# Patient Record
Sex: FEMALE | Race: WHITE | Employment: FULL TIME | URBAN - NONMETROPOLITAN AREA
[De-identification: names, ages, dates, MRNs, and addresses within clinical notes are randomized per-mention and may not be internally consistent; named-entity substitution may affect disease eponyms.]

---

## 2017-03-11 ENCOUNTER — OFFICE VISIT (OUTPATIENT)
Dept: PRIMARY CARE CLINIC | Age: 47
End: 2017-03-11
Payer: COMMERCIAL

## 2017-03-11 VITALS
HEART RATE: 81 BPM | WEIGHT: 202 LBS | HEIGHT: 63 IN | BODY MASS INDEX: 35.79 KG/M2 | OXYGEN SATURATION: 99 % | SYSTOLIC BLOOD PRESSURE: 134 MMHG | DIASTOLIC BLOOD PRESSURE: 90 MMHG

## 2017-03-11 DIAGNOSIS — M25.522 ELBOW PAIN, LEFT: ICD-10-CM

## 2017-03-11 DIAGNOSIS — S52.102A CLOSED FRACTURE OF PROXIMAL END OF LEFT RADIUS, UNSPECIFIED FRACTURE MORPHOLOGY, INITIAL ENCOUNTER: Primary | ICD-10-CM

## 2017-03-11 PROCEDURE — 99214 OFFICE O/P EST MOD 30 MIN: CPT | Performed by: FAMILY MEDICINE

## 2017-03-11 PROCEDURE — A4565 SLINGS: HCPCS | Performed by: FAMILY MEDICINE

## 2017-03-11 RX ORDER — HYDROCODONE BITARTRATE AND ACETAMINOPHEN 5; 325 MG/1; MG/1
1 TABLET ORAL EVERY 6 HOURS PRN
Qty: 20 TABLET | Refills: 0 | Status: SHIPPED | OUTPATIENT
Start: 2017-03-11 | End: 2017-05-02 | Stop reason: ALTCHOICE

## 2017-03-11 ASSESSMENT — ENCOUNTER SYMPTOMS
RESPIRATORY NEGATIVE: 1
EYES NEGATIVE: 1
GASTROINTESTINAL NEGATIVE: 1
BACK PAIN: 0

## 2017-03-13 ENCOUNTER — OFFICE VISIT (OUTPATIENT)
Dept: ORTHOPEDIC SURGERY | Age: 47
End: 2017-03-13
Payer: COMMERCIAL

## 2017-03-13 VITALS
WEIGHT: 201.94 LBS | SYSTOLIC BLOOD PRESSURE: 130 MMHG | HEIGHT: 63 IN | HEART RATE: 72 BPM | DIASTOLIC BLOOD PRESSURE: 68 MMHG | BODY MASS INDEX: 35.78 KG/M2

## 2017-03-13 DIAGNOSIS — S52.502A CLOSED FRACTURE OF DISTAL END OF LEFT RADIUS, UNSPECIFIED FRACTURE MORPHOLOGY, INITIAL ENCOUNTER: Primary | ICD-10-CM

## 2017-03-13 PROCEDURE — 25600 CLTX DST RDL FX/EPHYS SEP WO: CPT | Performed by: PHYSICIAN ASSISTANT

## 2017-03-13 PROCEDURE — 99203 OFFICE O/P NEW LOW 30 MIN: CPT | Performed by: PHYSICIAN ASSISTANT

## 2017-03-30 DIAGNOSIS — S42.402S LEFT ELBOW FRACTURE, SEQUELA: Primary | ICD-10-CM

## 2017-04-03 ENCOUNTER — OFFICE VISIT (OUTPATIENT)
Dept: ORTHOPEDIC SURGERY | Age: 47
End: 2017-04-03

## 2017-04-03 VITALS
WEIGHT: 201 LBS | DIASTOLIC BLOOD PRESSURE: 62 MMHG | HEART RATE: 62 BPM | SYSTOLIC BLOOD PRESSURE: 124 MMHG | HEIGHT: 63 IN | BODY MASS INDEX: 35.61 KG/M2

## 2017-04-03 DIAGNOSIS — S42.402S LEFT ELBOW FRACTURE, SEQUELA: Primary | ICD-10-CM

## 2017-04-03 PROCEDURE — 99024 POSTOP FOLLOW-UP VISIT: CPT | Performed by: PHYSICIAN ASSISTANT

## 2017-04-12 RX ORDER — BUDESONIDE AND FORMOTEROL FUMARATE DIHYDRATE 80; 4.5 UG/1; UG/1
2 AEROSOL RESPIRATORY (INHALATION) DAILY
Qty: 1 INHALER | Refills: 0 | Status: SHIPPED | OUTPATIENT
Start: 2017-04-12 | End: 2017-07-14 | Stop reason: SDUPTHER

## 2017-04-28 DIAGNOSIS — S42.401S ELBOW FRACTURE, RIGHT, SEQUELA: Primary | ICD-10-CM

## 2017-05-02 ENCOUNTER — OFFICE VISIT (OUTPATIENT)
Dept: ORTHOPEDIC SURGERY | Age: 47
End: 2017-05-02

## 2017-05-02 VITALS
HEIGHT: 62 IN | WEIGHT: 209 LBS | BODY MASS INDEX: 38.46 KG/M2 | SYSTOLIC BLOOD PRESSURE: 126 MMHG | DIASTOLIC BLOOD PRESSURE: 68 MMHG

## 2017-05-02 DIAGNOSIS — S52.125D CLOSED NONDISPLACED FRACTURE OF HEAD OF LEFT RADIUS WITH ROUTINE HEALING, SUBSEQUENT ENCOUNTER: Primary | ICD-10-CM

## 2017-05-02 PROCEDURE — 99024 POSTOP FOLLOW-UP VISIT: CPT | Performed by: PHYSICIAN ASSISTANT

## 2017-06-23 ENCOUNTER — TELEPHONE (OUTPATIENT)
Dept: FAMILY MEDICINE CLINIC | Age: 47
End: 2017-06-23

## 2017-07-14 RX ORDER — BUDESONIDE AND FORMOTEROL FUMARATE DIHYDRATE 80; 4.5 UG/1; UG/1
2 AEROSOL RESPIRATORY (INHALATION) DAILY
Qty: 1 INHALER | Refills: 0 | Status: SHIPPED | OUTPATIENT
Start: 2017-07-14 | End: 2017-08-03 | Stop reason: SDUPTHER

## 2017-08-03 ENCOUNTER — OFFICE VISIT (OUTPATIENT)
Dept: FAMILY MEDICINE CLINIC | Age: 47
End: 2017-08-03
Payer: COMMERCIAL

## 2017-08-03 VITALS
SYSTOLIC BLOOD PRESSURE: 118 MMHG | HEIGHT: 62 IN | DIASTOLIC BLOOD PRESSURE: 64 MMHG | BODY MASS INDEX: 37.73 KG/M2 | HEART RATE: 68 BPM | WEIGHT: 205 LBS

## 2017-08-03 DIAGNOSIS — J45.909 UNCOMPLICATED ASTHMA, UNSPECIFIED ASTHMA SEVERITY: ICD-10-CM

## 2017-08-03 DIAGNOSIS — Z23 NEED FOR PROPHYLACTIC VACCINATION AGAINST STREPTOCOCCUS PNEUMONIAE (PNEUMOCOCCUS): ICD-10-CM

## 2017-08-03 DIAGNOSIS — Z23 NEED FOR PROPHYLACTIC VACCINATION WITH TETANUS-DIPHTHERIA (TD): ICD-10-CM

## 2017-08-03 DIAGNOSIS — E66.9 OBESITY WITH BODY MASS INDEX 30 OR GREATER: ICD-10-CM

## 2017-08-03 DIAGNOSIS — Z00.00 PREVENTATIVE HEALTH CARE: Primary | ICD-10-CM

## 2017-08-03 PROCEDURE — 99214 OFFICE O/P EST MOD 30 MIN: CPT | Performed by: FAMILY MEDICINE

## 2017-08-03 RX ORDER — ALBUTEROL SULFATE 90 UG/1
2 AEROSOL, METERED RESPIRATORY (INHALATION) EVERY 6 HOURS PRN
Qty: 1 INHALER | Refills: 3 | Status: SHIPPED | OUTPATIENT
Start: 2017-08-03 | End: 2018-11-19 | Stop reason: SDUPTHER

## 2017-08-03 RX ORDER — BUDESONIDE AND FORMOTEROL FUMARATE DIHYDRATE 80; 4.5 UG/1; UG/1
2 AEROSOL RESPIRATORY (INHALATION) DAILY
Qty: 1 INHALER | Refills: 5 | Status: SHIPPED | OUTPATIENT
Start: 2017-08-03 | End: 2018-10-22 | Stop reason: SDUPTHER

## 2017-08-03 ASSESSMENT — ENCOUNTER SYMPTOMS
ALLERGIC/IMMUNOLOGIC NEGATIVE: 1
GASTROINTESTINAL NEGATIVE: 1
EYES NEGATIVE: 1
RESPIRATORY NEGATIVE: 1

## 2017-09-10 ENCOUNTER — TELEPHONE (OUTPATIENT)
Dept: GENERAL RADIOLOGY | Age: 47
End: 2017-09-10

## 2017-09-11 DIAGNOSIS — Z12.31 VISIT FOR SCREENING MAMMOGRAM: Primary | ICD-10-CM

## 2017-09-15 ENCOUNTER — HOSPITAL ENCOUNTER (OUTPATIENT)
Dept: LAB | Age: 47
Discharge: HOME | End: 2017-09-15
Payer: COMMERCIAL

## 2017-09-15 DIAGNOSIS — Z00.00 PREVENTATIVE HEALTH CARE: ICD-10-CM

## 2017-09-15 LAB
ABSOLUTE EOS #: 0.3 K/UL (ref 0–0.4)
ABSOLUTE LYMPH #: 1.9 K/UL (ref 1–4.8)
ABSOLUTE MONO #: 0.5 K/UL (ref 0.1–1.2)
ANION GAP SERPL CALCULATED.3IONS-SCNC: 13 MMOL/L (ref 9–17)
BASOPHILS # BLD: 1 % (ref 0–1)
BASOPHILS ABSOLUTE: 0 K/UL (ref 0–0.2)
BUN BLDV-MCNC: 15 MG/DL (ref 6–20)
BUN/CREAT BLD: 26 (ref 9–20)
CALCIUM SERPL-MCNC: 9.2 MG/DL (ref 8.6–10.4)
CHLORIDE BLD-SCNC: 103 MMOL/L (ref 98–107)
CHOLESTEROL/HDL RATIO: 4
CHOLESTEROL: 192 MG/DL
CO2: 24 MMOL/L (ref 20–31)
CREAT SERPL-MCNC: 0.58 MG/DL (ref 0.5–0.9)
DIFFERENTIAL TYPE: NORMAL
EOSINOPHILS RELATIVE PERCENT: 4 % (ref 1–7)
GFR AFRICAN AMERICAN: >60 ML/MIN
GFR NON-AFRICAN AMERICAN: >60 ML/MIN
GFR SERPL CREATININE-BSD FRML MDRD: ABNORMAL ML/MIN/{1.73_M2}
GFR SERPL CREATININE-BSD FRML MDRD: ABNORMAL ML/MIN/{1.73_M2}
GLUCOSE BLD-MCNC: 201 MG/DL (ref 70–99)
HCT VFR BLD CALC: 39.9 % (ref 36–46)
HDLC SERPL-MCNC: 48 MG/DL
HEMOGLOBIN: 13 G/DL (ref 12–16)
LDL CHOLESTEROL: 118 MG/DL (ref 0–130)
LYMPHOCYTES # BLD: 27 % (ref 16–46)
MCH RBC QN AUTO: 28.4 PG (ref 26–34)
MCHC RBC AUTO-ENTMCNC: 32.7 G/DL (ref 31–37)
MCV RBC AUTO: 86.7 FL (ref 80–100)
MONOCYTES # BLD: 7 % (ref 4–11)
PDW BLD-RTO: 13.6 % (ref 11–14.5)
PLATELET # BLD: 194 K/UL (ref 140–450)
PLATELET ESTIMATE: NORMAL
PMV BLD AUTO: 8.9 FL (ref 6–12)
POTASSIUM SERPL-SCNC: 5 MMOL/L (ref 3.7–5.3)
RBC # BLD: 4.6 M/UL (ref 4–5.2)
RBC # BLD: NORMAL 10*6/UL
SEG NEUTROPHILS: 61 % (ref 43–77)
SEGMENTED NEUTROPHILS ABSOLUTE COUNT: 4.3 K/UL (ref 1.8–7.7)
SODIUM BLD-SCNC: 140 MMOL/L (ref 135–144)
TRIGL SERPL-MCNC: 131 MG/DL
VLDLC SERPL CALC-MCNC: NORMAL MG/DL (ref 1–30)
WBC # BLD: 6.9 K/UL (ref 3.5–11)
WBC # BLD: NORMAL 10*3/UL

## 2017-09-15 PROCEDURE — 80061 LIPID PANEL: CPT

## 2017-09-15 PROCEDURE — 80048 BASIC METABOLIC PNL TOTAL CA: CPT

## 2017-09-15 PROCEDURE — 36415 COLL VENOUS BLD VENIPUNCTURE: CPT

## 2017-09-15 PROCEDURE — 85025 COMPLETE CBC W/AUTO DIFF WBC: CPT

## 2017-09-20 ENCOUNTER — TELEPHONE (OUTPATIENT)
Dept: FAMILY MEDICINE CLINIC | Age: 47
End: 2017-09-20

## 2017-09-21 ENCOUNTER — OFFICE VISIT (OUTPATIENT)
Dept: PRIMARY CARE CLINIC | Age: 47
End: 2017-09-21
Payer: COMMERCIAL

## 2017-09-21 VITALS
DIASTOLIC BLOOD PRESSURE: 72 MMHG | BODY MASS INDEX: 37.54 KG/M2 | HEART RATE: 72 BPM | SYSTOLIC BLOOD PRESSURE: 120 MMHG | WEIGHT: 204 LBS | HEIGHT: 62 IN

## 2017-09-21 DIAGNOSIS — E11.9 NEW ONSET TYPE 2 DIABETES MELLITUS (HCC): Primary | ICD-10-CM

## 2017-09-21 PROCEDURE — 99214 OFFICE O/P EST MOD 30 MIN: CPT | Performed by: FAMILY MEDICINE

## 2017-09-21 RX ORDER — METFORMIN HYDROCHLORIDE 750 MG/1
750 TABLET, EXTENDED RELEASE ORAL
Qty: 30 TABLET | Refills: 3 | Status: SHIPPED | OUTPATIENT
Start: 2017-09-21 | End: 2018-02-11 | Stop reason: SDUPTHER

## 2017-09-21 ASSESSMENT — ENCOUNTER SYMPTOMS
GASTROINTESTINAL NEGATIVE: 1
RESPIRATORY NEGATIVE: 1
ALLERGIC/IMMUNOLOGIC NEGATIVE: 1
EYES NEGATIVE: 1

## 2017-09-23 ENCOUNTER — HOSPITAL ENCOUNTER (OUTPATIENT)
Dept: MAMMOGRAPHY | Age: 47
Discharge: HOME | End: 2017-09-23
Payer: COMMERCIAL

## 2017-09-23 DIAGNOSIS — Z12.31 ENCOUNTER FOR MAMMOGRAM TO ESTABLISH BASELINE MAMMOGRAM: ICD-10-CM

## 2017-09-23 PROCEDURE — G0202 SCR MAMMO BI INCL CAD: HCPCS

## 2018-02-12 RX ORDER — METFORMIN HYDROCHLORIDE 750 MG/1
TABLET, EXTENDED RELEASE ORAL
Qty: 30 TABLET | Refills: 1 | Status: SHIPPED | OUTPATIENT
Start: 2018-02-12 | End: 2018-04-18 | Stop reason: SDUPTHER

## 2018-02-19 ENCOUNTER — HOSPITAL ENCOUNTER (OUTPATIENT)
Dept: LAB | Age: 48
Setting detail: SPECIMEN
Discharge: HOME | End: 2018-02-19
Payer: COMMERCIAL

## 2018-02-19 DIAGNOSIS — E11.9 NEW ONSET TYPE 2 DIABETES MELLITUS (HCC): ICD-10-CM

## 2018-02-19 LAB
ANION GAP SERPL CALCULATED.3IONS-SCNC: 14 MMOL/L (ref 9–17)
BUN BLDV-MCNC: 14 MG/DL (ref 6–20)
BUN/CREAT BLD: 23 (ref 9–20)
CALCIUM SERPL-MCNC: 8.7 MG/DL (ref 8.6–10.4)
CHLORIDE BLD-SCNC: 103 MMOL/L (ref 98–107)
CHOLESTEROL/HDL RATIO: 3.8
CHOLESTEROL: 162 MG/DL
CO2: 24 MMOL/L (ref 20–31)
CREAT SERPL-MCNC: 0.61 MG/DL (ref 0.5–0.9)
CREATININE URINE: 124 MG/DL (ref 28–217)
ESTIMATED AVERAGE GLUCOSE: 137 MG/DL
GFR AFRICAN AMERICAN: >60 ML/MIN
GFR NON-AFRICAN AMERICAN: >60 ML/MIN
GFR SERPL CREATININE-BSD FRML MDRD: ABNORMAL ML/MIN/{1.73_M2}
GFR SERPL CREATININE-BSD FRML MDRD: ABNORMAL ML/MIN/{1.73_M2}
GLUCOSE BLD-MCNC: 155 MG/DL (ref 70–99)
HBA1C MFR BLD: 6.4 % (ref 4.8–5.9)
HDLC SERPL-MCNC: 43 MG/DL
LDL CHOLESTEROL: 89 MG/DL (ref 0–130)
MICROALBUMIN/CREAT 24H UR: <12 MG/L
MICROALBUMIN/CREAT UR-RTO: NORMAL MCG/MG CREAT
POTASSIUM SERPL-SCNC: 4.5 MMOL/L (ref 3.7–5.3)
SODIUM BLD-SCNC: 141 MMOL/L (ref 135–144)
TRIGL SERPL-MCNC: 150 MG/DL
VLDLC SERPL CALC-MCNC: ABNORMAL MG/DL (ref 1–30)

## 2018-02-19 PROCEDURE — 82043 UR ALBUMIN QUANTITATIVE: CPT

## 2018-02-19 PROCEDURE — 36415 COLL VENOUS BLD VENIPUNCTURE: CPT

## 2018-02-19 PROCEDURE — 82570 ASSAY OF URINE CREATININE: CPT

## 2018-02-19 PROCEDURE — 83036 HEMOGLOBIN GLYCOSYLATED A1C: CPT

## 2018-02-19 PROCEDURE — 80061 LIPID PANEL: CPT

## 2018-02-19 PROCEDURE — 80048 BASIC METABOLIC PNL TOTAL CA: CPT

## 2018-04-18 RX ORDER — METFORMIN HYDROCHLORIDE 750 MG/1
TABLET, EXTENDED RELEASE ORAL
Qty: 30 TABLET | Refills: 5 | Status: SHIPPED | OUTPATIENT
Start: 2018-04-18 | End: 2018-11-17 | Stop reason: SDUPTHER

## 2018-08-25 ENCOUNTER — HOSPITAL ENCOUNTER (OUTPATIENT)
Dept: GENERAL RADIOLOGY | Age: 48
Discharge: HOME | End: 2018-08-27
Payer: COMMERCIAL

## 2018-08-25 ENCOUNTER — HOSPITAL ENCOUNTER (OUTPATIENT)
Age: 48
Discharge: HOME | End: 2018-08-27
Payer: COMMERCIAL

## 2018-08-25 ENCOUNTER — OFFICE VISIT (OUTPATIENT)
Dept: PRIMARY CARE CLINIC | Age: 48
End: 2018-08-25
Payer: COMMERCIAL

## 2018-08-25 VITALS
HEART RATE: 87 BPM | TEMPERATURE: 99.3 F | WEIGHT: 180.8 LBS | SYSTOLIC BLOOD PRESSURE: 116 MMHG | HEIGHT: 63 IN | BODY MASS INDEX: 32.04 KG/M2 | DIASTOLIC BLOOD PRESSURE: 80 MMHG

## 2018-08-25 DIAGNOSIS — M25.561 ACUTE PAIN OF RIGHT KNEE: Primary | ICD-10-CM

## 2018-08-25 DIAGNOSIS — M25.561 ACUTE PAIN OF RIGHT KNEE: ICD-10-CM

## 2018-08-25 PROCEDURE — 99214 OFFICE O/P EST MOD 30 MIN: CPT | Performed by: FAMILY MEDICINE

## 2018-08-25 PROCEDURE — 73562 X-RAY EXAM OF KNEE 3: CPT

## 2018-08-25 ASSESSMENT — PATIENT HEALTH QUESTIONNAIRE - PHQ9
SUM OF ALL RESPONSES TO PHQ9 QUESTIONS 1 & 2: 0
SUM OF ALL RESPONSES TO PHQ QUESTIONS 1-9: 0
2. FEELING DOWN, DEPRESSED OR HOPELESS: 0
1. LITTLE INTEREST OR PLEASURE IN DOING THINGS: 0
SUM OF ALL RESPONSES TO PHQ QUESTIONS 1-9: 0

## 2018-09-12 ENCOUNTER — TELEPHONE (OUTPATIENT)
Dept: FAMILY MEDICINE CLINIC | Age: 48
End: 2018-09-12

## 2018-09-12 DIAGNOSIS — E13.9 OTHER SPECIFIED DIABETES MELLITUS WITHOUT COMPLICATION, WITHOUT LONG-TERM CURRENT USE OF INSULIN (HCC): Primary | ICD-10-CM

## 2018-09-14 ENCOUNTER — HOSPITAL ENCOUNTER (OUTPATIENT)
Dept: LAB | Age: 48
Setting detail: SPECIMEN
Discharge: HOME | End: 2018-09-14
Payer: COMMERCIAL

## 2018-09-14 DIAGNOSIS — E13.9 OTHER SPECIFIED DIABETES MELLITUS WITHOUT COMPLICATION, WITHOUT LONG-TERM CURRENT USE OF INSULIN (HCC): ICD-10-CM

## 2018-09-14 LAB
ABSOLUTE EOS #: 0.3 K/UL (ref 0–0.4)
ABSOLUTE IMMATURE GRANULOCYTE: NORMAL K/UL (ref 0–0.3)
ABSOLUTE LYMPH #: 2 K/UL (ref 1–4.8)
ABSOLUTE MONO #: 0.5 K/UL (ref 0.1–1.2)
ANION GAP SERPL CALCULATED.3IONS-SCNC: 12 MMOL/L (ref 9–17)
BASOPHILS # BLD: 0 % (ref 0–1)
BASOPHILS ABSOLUTE: 0 K/UL (ref 0–0.2)
BUN BLDV-MCNC: 13 MG/DL (ref 6–20)
BUN/CREAT BLD: 20 (ref 9–20)
CALCIUM SERPL-MCNC: 9 MG/DL (ref 8.6–10.4)
CHLORIDE BLD-SCNC: 105 MMOL/L (ref 98–107)
CHOLESTEROL/HDL RATIO: 3.3
CHOLESTEROL: 156 MG/DL
CO2: 24 MMOL/L (ref 20–31)
CREAT SERPL-MCNC: 0.66 MG/DL (ref 0.5–0.9)
DIFFERENTIAL TYPE: NORMAL
EOSINOPHILS RELATIVE PERCENT: 5 % (ref 1–7)
ESTIMATED AVERAGE GLUCOSE: 131 MG/DL
GFR AFRICAN AMERICAN: >60 ML/MIN
GFR NON-AFRICAN AMERICAN: >60 ML/MIN
GFR SERPL CREATININE-BSD FRML MDRD: ABNORMAL ML/MIN/{1.73_M2}
GFR SERPL CREATININE-BSD FRML MDRD: ABNORMAL ML/MIN/{1.73_M2}
GLUCOSE BLD-MCNC: 146 MG/DL (ref 70–99)
HBA1C MFR BLD: 6.2 % (ref 4.8–5.9)
HCT VFR BLD CALC: 39.6 % (ref 36–46)
HDLC SERPL-MCNC: 48 MG/DL
HEMOGLOBIN: 13.4 G/DL (ref 12–16)
IMMATURE GRANULOCYTES: NORMAL %
LDL CHOLESTEROL: 92 MG/DL (ref 0–130)
LYMPHOCYTES # BLD: 29 % (ref 16–46)
MCH RBC QN AUTO: 29.6 PG (ref 26–34)
MCHC RBC AUTO-ENTMCNC: 33.7 G/DL (ref 31–37)
MCV RBC AUTO: 88 FL (ref 80–100)
MONOCYTES # BLD: 8 % (ref 4–11)
NRBC AUTOMATED: NORMAL PER 100 WBC
PDW BLD-RTO: 14.4 % (ref 11–14.5)
PLATELET # BLD: 207 K/UL (ref 140–450)
PLATELET ESTIMATE: NORMAL
PMV BLD AUTO: 9 FL (ref 6–12)
POTASSIUM SERPL-SCNC: 5.1 MMOL/L (ref 3.7–5.3)
RBC # BLD: 4.51 M/UL (ref 4–5.2)
RBC # BLD: NORMAL 10*6/UL
SEG NEUTROPHILS: 58 % (ref 43–77)
SEGMENTED NEUTROPHILS ABSOLUTE COUNT: 4 K/UL (ref 1.8–7.7)
SODIUM BLD-SCNC: 141 MMOL/L (ref 135–144)
TRIGL SERPL-MCNC: 78 MG/DL
VLDLC SERPL CALC-MCNC: NORMAL MG/DL (ref 1–30)
WBC # BLD: 6.9 K/UL (ref 3.5–11)
WBC # BLD: NORMAL 10*3/UL

## 2018-09-14 PROCEDURE — 80061 LIPID PANEL: CPT

## 2018-09-14 PROCEDURE — 85025 COMPLETE CBC W/AUTO DIFF WBC: CPT

## 2018-09-14 PROCEDURE — 83036 HEMOGLOBIN GLYCOSYLATED A1C: CPT

## 2018-09-14 PROCEDURE — 80048 BASIC METABOLIC PNL TOTAL CA: CPT

## 2018-09-14 PROCEDURE — 36415 COLL VENOUS BLD VENIPUNCTURE: CPT

## 2018-10-22 RX ORDER — BUDESONIDE AND FORMOTEROL FUMARATE DIHYDRATE 80; 4.5 UG/1; UG/1
2 AEROSOL RESPIRATORY (INHALATION) DAILY
Qty: 1 INHALER | Refills: 0 | Status: SHIPPED | OUTPATIENT
Start: 2018-10-22 | End: 2018-11-19 | Stop reason: SDUPTHER

## 2018-11-19 RX ORDER — METFORMIN HYDROCHLORIDE 750 MG/1
TABLET, EXTENDED RELEASE ORAL
Qty: 30 TABLET | Refills: 5 | Status: CANCELLED | OUTPATIENT
Start: 2018-11-19

## 2018-11-20 RX ORDER — ALBUTEROL SULFATE 90 UG/1
2 AEROSOL, METERED RESPIRATORY (INHALATION) EVERY 6 HOURS PRN
Qty: 1 INHALER | Refills: 3 | Status: SHIPPED | OUTPATIENT
Start: 2018-11-20 | End: 2020-01-20 | Stop reason: SDUPTHER

## 2018-11-20 RX ORDER — BUDESONIDE AND FORMOTEROL FUMARATE DIHYDRATE 80; 4.5 UG/1; UG/1
2 AEROSOL RESPIRATORY (INHALATION) DAILY
Qty: 1 INHALER | Refills: 0 | Status: SHIPPED | OUTPATIENT
Start: 2018-11-20 | End: 2019-01-02 | Stop reason: SDUPTHER

## 2018-11-20 RX ORDER — METFORMIN HYDROCHLORIDE 750 MG/1
750 TABLET, EXTENDED RELEASE ORAL
Qty: 30 TABLET | Refills: 0 | Status: SHIPPED | OUTPATIENT
Start: 2018-11-20 | End: 2018-12-17 | Stop reason: SDUPTHER

## 2018-12-17 RX ORDER — METFORMIN HYDROCHLORIDE 750 MG/1
TABLET, EXTENDED RELEASE ORAL
Qty: 30 TABLET | Refills: 0 | Status: SHIPPED | OUTPATIENT
Start: 2018-12-17 | End: 2019-01-02 | Stop reason: SDUPTHER

## 2019-01-02 ENCOUNTER — OFFICE VISIT (OUTPATIENT)
Dept: FAMILY MEDICINE CLINIC | Age: 49
End: 2019-01-02
Payer: COMMERCIAL

## 2019-01-02 VITALS
HEIGHT: 63 IN | DIASTOLIC BLOOD PRESSURE: 68 MMHG | HEART RATE: 68 BPM | BODY MASS INDEX: 32.96 KG/M2 | WEIGHT: 186 LBS | SYSTOLIC BLOOD PRESSURE: 118 MMHG

## 2019-01-02 DIAGNOSIS — J45.20 MILD INTERMITTENT ASTHMA WITHOUT COMPLICATION: ICD-10-CM

## 2019-01-02 DIAGNOSIS — E11.9 TYPE 2 DIABETES MELLITUS WITHOUT COMPLICATION, WITHOUT LONG-TERM CURRENT USE OF INSULIN (HCC): ICD-10-CM

## 2019-01-02 DIAGNOSIS — E66.9 OBESITY WITH BODY MASS INDEX 30 OR GREATER: Primary | ICD-10-CM

## 2019-01-02 PROCEDURE — 99214 OFFICE O/P EST MOD 30 MIN: CPT | Performed by: FAMILY MEDICINE

## 2019-01-02 RX ORDER — BUDESONIDE AND FORMOTEROL FUMARATE DIHYDRATE 80; 4.5 UG/1; UG/1
2 AEROSOL RESPIRATORY (INHALATION) DAILY
Qty: 1 INHALER | Refills: 5 | Status: SHIPPED | OUTPATIENT
Start: 2019-01-02 | End: 2020-01-17 | Stop reason: SDUPTHER

## 2019-01-02 RX ORDER — METFORMIN HYDROCHLORIDE 750 MG/1
TABLET, EXTENDED RELEASE ORAL
Qty: 90 TABLET | Refills: 3 | Status: SHIPPED | OUTPATIENT
Start: 2019-01-02 | End: 2020-01-10

## 2019-01-02 ASSESSMENT — ENCOUNTER SYMPTOMS
EYES NEGATIVE: 1
ALLERGIC/IMMUNOLOGIC NEGATIVE: 1
RESPIRATORY NEGATIVE: 1
GASTROINTESTINAL NEGATIVE: 1

## 2019-01-02 ASSESSMENT — PATIENT HEALTH QUESTIONNAIRE - PHQ9
SUM OF ALL RESPONSES TO PHQ QUESTIONS 1-9: 0
SUM OF ALL RESPONSES TO PHQ9 QUESTIONS 1 & 2: 0
SUM OF ALL RESPONSES TO PHQ QUESTIONS 1-9: 0
2. FEELING DOWN, DEPRESSED OR HOPELESS: 0
1. LITTLE INTEREST OR PLEASURE IN DOING THINGS: 0

## 2019-03-06 ENCOUNTER — TELEPHONE (OUTPATIENT)
Dept: MAMMOGRAPHY | Age: 49
End: 2019-03-06

## 2019-03-08 ENCOUNTER — TELEPHONE (OUTPATIENT)
Dept: MAMMOGRAPHY | Age: 49
End: 2019-03-08

## 2019-03-08 DIAGNOSIS — Z12.39 BREAST SCREENING: Primary | ICD-10-CM

## 2019-03-09 ENCOUNTER — HOSPITAL ENCOUNTER (OUTPATIENT)
Dept: MAMMOGRAPHY | Age: 49
Discharge: HOME | End: 2019-03-11
Payer: COMMERCIAL

## 2019-03-09 DIAGNOSIS — Z12.39 BREAST SCREENING: ICD-10-CM

## 2019-03-09 PROCEDURE — 77063 BREAST TOMOSYNTHESIS BI: CPT

## 2019-07-16 ENCOUNTER — HOSPITAL ENCOUNTER (OUTPATIENT)
Dept: LAB | Age: 49
Discharge: HOME | End: 2019-07-16
Payer: COMMERCIAL

## 2019-07-16 DIAGNOSIS — E11.9 TYPE 2 DIABETES MELLITUS WITHOUT COMPLICATION, WITHOUT LONG-TERM CURRENT USE OF INSULIN (HCC): ICD-10-CM

## 2019-07-16 LAB
ABSOLUTE EOS #: 0.2 K/UL (ref 0–0.4)
ABSOLUTE IMMATURE GRANULOCYTE: NORMAL K/UL (ref 0–0.3)
ABSOLUTE LYMPH #: 1.5 K/UL (ref 1–4.8)
ABSOLUTE MONO #: 0.4 K/UL (ref 0.1–1.2)
ANION GAP SERPL CALCULATED.3IONS-SCNC: 11 MMOL/L (ref 9–17)
BASOPHILS # BLD: 0 % (ref 0–1)
BASOPHILS ABSOLUTE: 0 K/UL (ref 0–0.2)
BUN BLDV-MCNC: 10 MG/DL (ref 6–20)
BUN/CREAT BLD: 17 (ref 9–20)
CALCIUM SERPL-MCNC: 8.9 MG/DL (ref 8.6–10.4)
CHLORIDE BLD-SCNC: 107 MMOL/L (ref 98–107)
CO2: 25 MMOL/L (ref 20–31)
CREAT SERPL-MCNC: 0.58 MG/DL (ref 0.5–0.9)
CREATININE URINE: 329.3 MG/DL (ref 28–217)
DIFFERENTIAL TYPE: NORMAL
EOSINOPHILS RELATIVE PERCENT: 4 % (ref 1–7)
ESTIMATED AVERAGE GLUCOSE: 131 MG/DL
GFR AFRICAN AMERICAN: >60 ML/MIN
GFR NON-AFRICAN AMERICAN: >60 ML/MIN
GFR SERPL CREATININE-BSD FRML MDRD: ABNORMAL ML/MIN/{1.73_M2}
GFR SERPL CREATININE-BSD FRML MDRD: ABNORMAL ML/MIN/{1.73_M2}
GLUCOSE BLD-MCNC: 145 MG/DL (ref 70–99)
HBA1C MFR BLD: 6.2 % (ref 4.8–5.9)
HCT VFR BLD CALC: 39.2 % (ref 36–46)
HEMOGLOBIN: 12.8 G/DL (ref 12–16)
IMMATURE GRANULOCYTES: NORMAL %
LYMPHOCYTES # BLD: 25 % (ref 16–46)
MCH RBC QN AUTO: 28.6 PG (ref 26–34)
MCHC RBC AUTO-ENTMCNC: 32.7 G/DL (ref 31–37)
MCV RBC AUTO: 87.4 FL (ref 80–100)
MICROALBUMIN/CREAT 24H UR: <12 MG/L
MICROALBUMIN/CREAT UR-RTO: ABNORMAL MCG/MG CREAT
MONOCYTES # BLD: 7 % (ref 4–11)
NRBC AUTOMATED: NORMAL PER 100 WBC
PDW BLD-RTO: 14.2 % (ref 11–14.5)
PLATELET # BLD: 225 K/UL (ref 140–450)
PLATELET ESTIMATE: NORMAL
PMV BLD AUTO: 8.5 FL (ref 6–12)
POTASSIUM SERPL-SCNC: 4.3 MMOL/L (ref 3.7–5.3)
RBC # BLD: 4.48 M/UL (ref 4–5.2)
RBC # BLD: NORMAL 10*6/UL
SEG NEUTROPHILS: 64 % (ref 43–77)
SEGMENTED NEUTROPHILS ABSOLUTE COUNT: 4 K/UL (ref 1.8–7.7)
SODIUM BLD-SCNC: 143 MMOL/L (ref 135–144)
WBC # BLD: 6.2 K/UL (ref 3.5–11)
WBC # BLD: NORMAL 10*3/UL

## 2019-07-16 PROCEDURE — 83036 HEMOGLOBIN GLYCOSYLATED A1C: CPT

## 2019-07-16 PROCEDURE — 85025 COMPLETE CBC W/AUTO DIFF WBC: CPT

## 2019-07-16 PROCEDURE — 82570 ASSAY OF URINE CREATININE: CPT

## 2019-07-16 PROCEDURE — 82043 UR ALBUMIN QUANTITATIVE: CPT

## 2019-07-16 PROCEDURE — 36415 COLL VENOUS BLD VENIPUNCTURE: CPT

## 2019-07-16 PROCEDURE — 80048 BASIC METABOLIC PNL TOTAL CA: CPT

## 2019-07-19 ENCOUNTER — OFFICE VISIT (OUTPATIENT)
Dept: FAMILY MEDICINE CLINIC | Age: 49
End: 2019-07-19
Payer: COMMERCIAL

## 2019-07-19 VITALS
WEIGHT: 184.6 LBS | BODY MASS INDEX: 32.71 KG/M2 | DIASTOLIC BLOOD PRESSURE: 82 MMHG | HEART RATE: 74 BPM | SYSTOLIC BLOOD PRESSURE: 124 MMHG | HEIGHT: 63 IN

## 2019-07-19 DIAGNOSIS — J45.20 MILD INTERMITTENT ASTHMA WITHOUT COMPLICATION: ICD-10-CM

## 2019-07-19 DIAGNOSIS — E66.9 OBESITY WITH BODY MASS INDEX 30 OR GREATER: ICD-10-CM

## 2019-07-19 DIAGNOSIS — E11.9 TYPE 2 DIABETES MELLITUS WITHOUT COMPLICATION, WITHOUT LONG-TERM CURRENT USE OF INSULIN (HCC): Primary | ICD-10-CM

## 2019-07-19 PROCEDURE — 99214 OFFICE O/P EST MOD 30 MIN: CPT | Performed by: FAMILY MEDICINE

## 2019-07-19 ASSESSMENT — ENCOUNTER SYMPTOMS
EYES NEGATIVE: 1
ALLERGIC/IMMUNOLOGIC NEGATIVE: 1
DIARRHEA: 1
RESPIRATORY NEGATIVE: 1

## 2019-07-19 NOTE — PATIENT INSTRUCTIONS
07/16/2019 NOT REPORTED   Final    RBC Morphology 07/16/2019 NOT REPORTED   Final    Platelet Estimate 37/09/9483 NOT REPORTED   Final    Seg Neutrophils 07/16/2019 64  43 - 77 % Final    Lymphocytes 07/16/2019 25  16 - 46 % Final    Monocytes 07/16/2019 7  4 - 11 % Final    Eosinophils % 07/16/2019 4  1 - 7 % Final    Basophils 07/16/2019 0  0 - 1 % Final    Segs Absolute 07/16/2019 4.00  1.8 - 7.7 k/uL Final    Absolute Lymph # 07/16/2019 1.50  1.0 - 4.8 k/uL Final    Absolute Mono # 07/16/2019 0.40  0.1 - 1.2 k/uL Final    Absolute Eos # 07/16/2019 0.20  0.0 - 0.4 k/uL Final    Basophils # 07/16/2019 0.00  0.0 - 0.2 k/uL Final    Microalb, Ur 07/16/2019 <12  <21 mg/L Final    Creatinine, Ur 07/16/2019 329.3* 28.0 - 217.0 mg/dL Final    Microalb/Crt.  Ratio 07/16/2019 CANNOT BE CALCULATED  <25 mcg/mg creat Final

## 2019-07-19 NOTE — PROGRESS NOTES
Subjective:      Patient ID: Deborah Wood is a 50 y.o. female. Diabetes   Pertinent negatives for diabetes include no polydipsia, no polyphagia and no polyuria. Routine follow up on chronic medical conditions, refills, and review of updated labs. bs elevated. She has gestational diabetes with both pregnancies requiring insulin. She feels well. No polydipsia or polyphagia. Wt. Seems a little lower than in the past,  Then 204. Down to 180 lbs in September, back up to 186 over the holidays. She has no acute concerns or symptoms on review. Father had diabetes as an adult. She is not checking bs at home. Taking glucophage 750mg bid. Compliant with medications. Some random diarrhea, tolerable. Past Medical History:   Diagnosis Date    Asthma     Diabetes mellitus type 2, uncomplicated ([address]     Elbow fracture 2017    Left elbow    Gestational diabetes     Obesity      Past Surgical History:   Procedure Laterality Date     SECTION      x2    TONSILLECTOMY       Current Outpatient Medications   Medication Sig Dispense Refill    metFORMIN (GLUCOPHAGE-XR) 750 MG extended release tablet TAKE ONE TABLET BY MOUTH DAILY WITH BREAKFAST 90 tablet 3    budesonide-formoterol (SYMBICORT) 80-4.5 MCG/ACT AERO Inhale 2 puffs into the lungs daily (Patient taking differently: Inhale 2 puffs into the lungs daily Indications: taking as needed ) 1 Inhaler 5    albuterol sulfate  (90 Base) MCG/ACT inhaler Inhale 2 puffs into the lungs every 6 hours as needed for Wheezing 1 Inhaler 3     No current facility-administered medications for this visit. Allergies   Allergen Reactions    Bactrim [Sulfamethoxazole-Trimethoprim] Diarrhea         Review of Systems   Constitutional: Negative. HENT: Negative. Eyes: Negative. Respiratory: Negative. Cardiovascular: Negative. Gastrointestinal: Positive for diarrhea (mild, intermittent since starting glucophage. ).    Endocrine:

## 2019-12-23 ENCOUNTER — OFFICE VISIT (OUTPATIENT)
Dept: OPTOMETRY | Age: 49
End: 2019-12-23
Payer: COMMERCIAL

## 2019-12-23 DIAGNOSIS — H52.03 HYPEROPIA OF BOTH EYES WITH ASTIGMATISM AND PRESBYOPIA: ICD-10-CM

## 2019-12-23 DIAGNOSIS — H52.4 HYPEROPIA OF BOTH EYES WITH ASTIGMATISM AND PRESBYOPIA: ICD-10-CM

## 2019-12-23 DIAGNOSIS — H53.8 BLURRED VISION, BILATERAL: ICD-10-CM

## 2019-12-23 DIAGNOSIS — H52.203 HYPEROPIA OF BOTH EYES WITH ASTIGMATISM AND PRESBYOPIA: ICD-10-CM

## 2019-12-23 DIAGNOSIS — E11.9 NON-INSULIN DEPENDENT TYPE 2 DIABETES MELLITUS (HCC): Primary | ICD-10-CM

## 2019-12-23 PROCEDURE — 99214 OFFICE O/P EST MOD 30 MIN: CPT | Performed by: OPTOMETRIST

## 2019-12-23 PROCEDURE — 92250 FUNDUS PHOTOGRAPHY W/I&R: CPT | Performed by: OPTOMETRIST

## 2019-12-23 RX ORDER — TROPICAMIDE 10 MG/ML
1 SOLUTION/ DROPS OPHTHALMIC ONCE
Status: COMPLETED | OUTPATIENT
Start: 2019-12-23 | End: 2019-12-23

## 2019-12-23 RX ORDER — PHENYLEPHRINE HCL 2.5 %
1 DROPS OPHTHALMIC (EYE) ONCE
Status: COMPLETED | OUTPATIENT
Start: 2019-12-23 | End: 2019-12-23

## 2019-12-23 RX ADMIN — Medication 1 DROP: at 15:02

## 2019-12-23 RX ADMIN — TROPICAMIDE 1 DROP: 10 SOLUTION/ DROPS OPHTHALMIC at 15:02

## 2019-12-23 ASSESSMENT — REFRACTION_MANIFEST
OS_ADD: +1.75
OD_CYLINDER: DS
OD_SPHERE: +0.50
OD_ADD: +1.75
OS_SPHERE: +0.50
OS_CYLINDER: DS

## 2019-12-23 ASSESSMENT — KERATOMETRY
OS_K2POWER_DIOPTERS: 45.00
OS_K1POWER_DIOPTERS: 44.25
OS_AXISANGLE2_DEGREES: 175
OS_AXISANGLE_DEGREES: 085

## 2019-12-23 ASSESSMENT — VISUAL ACUITY
OD_SC: 20/40 OU
OS_SC+: -1
METHOD: SNELLEN - LINEAR
OD_SC: 20/20
OD_SC+: -1
OS_SC: 20/20

## 2019-12-23 ASSESSMENT — TONOMETRY
OS_IOP_MMHG: 24
OD_IOP_MMHG: 22
IOP_METHOD: NON-CONTACT AIR PUFF

## 2019-12-23 ASSESSMENT — SLIT LAMP EXAM - LIDS
COMMENTS: NORMAL
COMMENTS: NORMAL

## 2020-01-10 RX ORDER — METFORMIN HYDROCHLORIDE 750 MG/1
TABLET, EXTENDED RELEASE ORAL
Qty: 90 TABLET | Refills: 2 | Status: SHIPPED | OUTPATIENT
Start: 2020-01-10 | End: 2020-09-22 | Stop reason: SDUPTHER

## 2020-01-17 RX ORDER — BUDESONIDE AND FORMOTEROL FUMARATE DIHYDRATE 80; 4.5 UG/1; UG/1
2 AEROSOL RESPIRATORY (INHALATION) DAILY
Qty: 1 INHALER | Refills: 5 | Status: SHIPPED | OUTPATIENT
Start: 2020-01-17 | End: 2021-02-19

## 2020-01-17 NOTE — TELEPHONE ENCOUNTER
Margaret Lomas called requesting a refill of the below medication which has been pended for you:     Requested Prescriptions     Pending Prescriptions Disp Refills    budesonide-formoterol (SYMBICORT) 80-4.5 MCG/ACT AERO 1 Inhaler 5     Sig: Inhale 2 puffs into the lungs daily       Last Appointment Date: 7/19/2019  Next Appointment Date: 1/20/2020    Allergies   Allergen Reactions    Bactrim [Sulfamethoxazole-Trimethoprim] Diarrhea

## 2020-01-20 ENCOUNTER — HOSPITAL ENCOUNTER (OUTPATIENT)
Dept: LAB | Age: 50
Discharge: HOME | End: 2020-01-20
Payer: COMMERCIAL

## 2020-01-20 ENCOUNTER — OFFICE VISIT (OUTPATIENT)
Dept: FAMILY MEDICINE CLINIC | Age: 50
End: 2020-01-20
Payer: COMMERCIAL

## 2020-01-20 VITALS
DIASTOLIC BLOOD PRESSURE: 70 MMHG | BODY MASS INDEX: 32.25 KG/M2 | HEIGHT: 63 IN | WEIGHT: 182 LBS | SYSTOLIC BLOOD PRESSURE: 118 MMHG | HEART RATE: 68 BPM

## 2020-01-20 LAB
ABSOLUTE EOS #: 0.37 K/UL (ref 0–0.44)
ABSOLUTE IMMATURE GRANULOCYTE: 0 K/UL (ref 0–0.3)
ABSOLUTE LYMPH #: 2.14 K/UL (ref 1.1–3.7)
ABSOLUTE MONO #: 0.56 K/UL (ref 0.1–1.2)
ANION GAP SERPL CALCULATED.3IONS-SCNC: 14 MMOL/L (ref 9–17)
BASOPHILS # BLD: 0 % (ref 0–2)
BASOPHILS ABSOLUTE: 0 K/UL (ref 0–0.2)
BUN BLDV-MCNC: 11 MG/DL (ref 6–20)
BUN/CREAT BLD: 18 (ref 9–20)
CALCIUM SERPL-MCNC: 9.1 MG/DL (ref 8.6–10.4)
CHLORIDE BLD-SCNC: 103 MMOL/L (ref 98–107)
CO2: 22 MMOL/L (ref 20–31)
CREAT SERPL-MCNC: 0.61 MG/DL (ref 0.5–0.9)
DIFFERENTIAL TYPE: ABNORMAL
EOSINOPHILS RELATIVE PERCENT: 4 % (ref 1–4)
ESTIMATED AVERAGE GLUCOSE: 131 MG/DL
GFR AFRICAN AMERICAN: >60 ML/MIN
GFR NON-AFRICAN AMERICAN: >60 ML/MIN
GFR SERPL CREATININE-BSD FRML MDRD: ABNORMAL ML/MIN/{1.73_M2}
GFR SERPL CREATININE-BSD FRML MDRD: ABNORMAL ML/MIN/{1.73_M2}
GLUCOSE BLD-MCNC: 145 MG/DL (ref 70–99)
HBA1C MFR BLD: 6.2 % (ref 4.8–5.9)
HCT VFR BLD CALC: 40.5 % (ref 36.3–47.1)
HEMOGLOBIN: 13 G/DL (ref 11.9–15.1)
IMMATURE GRANULOCYTES: 0 %
LYMPHOCYTES # BLD: 23 % (ref 24–43)
MCH RBC QN AUTO: 28.3 PG (ref 25.2–33.5)
MCHC RBC AUTO-ENTMCNC: 32.1 G/DL (ref 25.2–33.5)
MCV RBC AUTO: 88.2 FL (ref 82.6–102.9)
MONOCYTES # BLD: 6 % (ref 3–12)
MORPHOLOGY: ABNORMAL
MORPHOLOGY: ABNORMAL
NRBC AUTOMATED: 0 PER 100 WBC
PDW BLD-RTO: 13.3 % (ref 11.8–14.4)
PLATELET # BLD: 161 K/UL (ref 138–453)
PLATELET ESTIMATE: ABNORMAL
PMV BLD AUTO: 11.4 FL (ref 8.1–13.5)
POTASSIUM SERPL-SCNC: 4.1 MMOL/L (ref 3.7–5.3)
RBC # BLD: 4.59 M/UL (ref 3.95–5.11)
RBC # BLD: ABNORMAL 10*6/UL
SEG NEUTROPHILS: 67 % (ref 36–65)
SEGMENTED NEUTROPHILS ABSOLUTE COUNT: 6.23 K/UL (ref 1.5–8.1)
SODIUM BLD-SCNC: 139 MMOL/L (ref 135–144)
WBC # BLD: 9.3 K/UL (ref 3.5–11.3)
WBC # BLD: ABNORMAL 10*3/UL

## 2020-01-20 PROCEDURE — 80048 BASIC METABOLIC PNL TOTAL CA: CPT

## 2020-01-20 PROCEDURE — 83036 HEMOGLOBIN GLYCOSYLATED A1C: CPT

## 2020-01-20 PROCEDURE — 99214 OFFICE O/P EST MOD 30 MIN: CPT | Performed by: FAMILY MEDICINE

## 2020-01-20 PROCEDURE — 36415 COLL VENOUS BLD VENIPUNCTURE: CPT

## 2020-01-20 PROCEDURE — 85025 COMPLETE CBC W/AUTO DIFF WBC: CPT

## 2020-01-20 RX ORDER — ALBUTEROL SULFATE 90 UG/1
2 AEROSOL, METERED RESPIRATORY (INHALATION) EVERY 6 HOURS PRN
Qty: 1 INHALER | Refills: 3 | Status: SHIPPED | OUTPATIENT
Start: 2020-01-20 | End: 2022-01-31 | Stop reason: SDUPTHER

## 2020-01-20 ASSESSMENT — ENCOUNTER SYMPTOMS
DIARRHEA: 1
EYES NEGATIVE: 1
ALLERGIC/IMMUNOLOGIC NEGATIVE: 1
RESPIRATORY NEGATIVE: 1

## 2020-01-20 ASSESSMENT — PATIENT HEALTH QUESTIONNAIRE - PHQ9
1. LITTLE INTEREST OR PLEASURE IN DOING THINGS: 0
SUM OF ALL RESPONSES TO PHQ9 QUESTIONS 1 & 2: 0
SUM OF ALL RESPONSES TO PHQ QUESTIONS 1-9: 0
SUM OF ALL RESPONSES TO PHQ QUESTIONS 1-9: 0
2. FEELING DOWN, DEPRESSED OR HOPELESS: 0

## 2020-01-20 NOTE — PATIENT INSTRUCTIONS
Hospital Outpatient Visit on 01/20/2020   Component Date Value Ref Range Status    WBC 01/20/2020 9.3  3.5 - 11.3 k/uL Final    RBC 01/20/2020 4.59  3.95 - 5.11 m/uL Final    Hemoglobin 01/20/2020 13.0  11.9 - 15.1 g/dL Final    Hematocrit 01/20/2020 40.5  36.3 - 47.1 % Final    MCV 01/20/2020 88.2  82.6 - 102.9 fL Final    MCH 01/20/2020 28.3  25.2 - 33.5 pg Final    MCHC 01/20/2020 32.1  25.2 - 33.5 g/dL Final    RDW 01/20/2020 13.3  11.8 - 14.4 % Final    Platelets 71/26/1828 161  138 - 453 k/uL Final    MPV 01/20/2020 11.4  8.1 - 13.5 fL Final    NRBC Automated 01/20/2020 0.0  0.0 per 100 WBC Final    Differential Type 01/20/2020 NOT REPORTED   Final    Seg Neutrophils 01/20/2020 PENDING  % Incomplete    Lymphocytes 01/20/2020 PENDING  % Incomplete    Monocytes 01/20/2020 PENDING  % Incomplete    Eosinophils % 01/20/2020 PENDING  % Incomplete    Basophils 01/20/2020 PENDING  % Incomplete    Immature Granulocytes 01/20/2020 PENDING  0 % Incomplete    Segs Absolute 01/20/2020 PENDING  k/uL Incomplete    Absolute Lymph # 01/20/2020 PENDING  k/uL Incomplete    Absolute Mono # 01/20/2020 PENDING  k/uL Incomplete    Absolute Eos # 01/20/2020 PENDING  k/uL Incomplete    Basophils Absolute 01/20/2020 PENDING  0.0 - 0.2 k/uL Incomplete    Absolute Immature Granulocyte 01/20/2020 PENDING  0.00 - 0.30 k/uL Incomplete    WBC Morphology 01/20/2020 NOT REPORTED   Final    RBC Morphology 01/20/2020 NOT REPORTED   Final    Platelet Estimate 22/50/9120 NOT REPORTED   Final

## 2020-01-20 NOTE — PROGRESS NOTES
Subjective:      Patient ID: Natalia Khan is a 52 y.o. female. Diabetes   Pertinent negatives for diabetes include no polydipsia, no polyphagia and no polyuria. Routine follow up on chronic medical conditions, refills, and review of updated labs. bs elevated. She has gestational diabetes with both pregnancies requiring insulin. She feels well. No polydipsia or polyphagia. Wt. Fairly stable over the interval.   She has no acute concerns or symptoms on review. Father had diabetes as an adult. She is not checking bs at home. Taking glucophage 750mg bid. Compliant with medications. Some random diarrhea, tolerable. Past Medical History:   Diagnosis Date    Asthma     Diabetes mellitus type 2, uncomplicated ([address]     Elbow fracture 2017    Left elbow    Gestational diabetes     Obesity      Past Surgical History:   Procedure Laterality Date     SECTION      x2    TONSILLECTOMY       Current Outpatient Medications   Medication Sig Dispense Refill    budesonide-formoterol (SYMBICORT) 80-4.5 MCG/ACT AERO Inhale 2 puffs into the lungs daily 1 Inhaler 5    metFORMIN (GLUCOPHAGE-XR) 750 MG extended release tablet TAKE ONE TABLET BY MOUTH DAILY WITH BREAKFAST 90 tablet 2    albuterol sulfate  (90 Base) MCG/ACT inhaler Inhale 2 puffs into the lungs every 6 hours as needed for Wheezing 1 Inhaler 3     No current facility-administered medications for this visit. Allergies   Allergen Reactions    Bactrim [Sulfamethoxazole-Trimethoprim] Diarrhea         Review of Systems   Constitutional: Negative. HENT: Negative. Eyes: Negative. Respiratory: Negative. Cardiovascular: Negative. Gastrointestinal: Positive for diarrhea (mild, intermittent since starting glucophage. ). Endocrine: Negative for polydipsia, polyphagia and polyuria. Genitourinary: Negative. Musculoskeletal: Negative. Skin: Negative. Allergic/Immunologic: Negative.     Neurological:

## 2020-02-20 ENCOUNTER — OFFICE VISIT (OUTPATIENT)
Dept: PRIMARY CARE CLINIC | Age: 50
End: 2020-02-20
Payer: COMMERCIAL

## 2020-02-20 VITALS
DIASTOLIC BLOOD PRESSURE: 82 MMHG | HEART RATE: 66 BPM | OXYGEN SATURATION: 100 % | BODY MASS INDEX: 33.68 KG/M2 | TEMPERATURE: 98.2 F | WEIGHT: 183 LBS | HEIGHT: 62 IN | SYSTOLIC BLOOD PRESSURE: 124 MMHG

## 2020-02-20 PROCEDURE — 99213 OFFICE O/P EST LOW 20 MIN: CPT | Performed by: PHYSICIAN ASSISTANT

## 2020-02-20 RX ORDER — AMOXICILLIN 500 MG/1
500 CAPSULE ORAL 3 TIMES DAILY
Qty: 30 CAPSULE | Refills: 0 | Status: SHIPPED | OUTPATIENT
Start: 2020-02-20 | End: 2020-03-01

## 2020-02-20 ASSESSMENT — ENCOUNTER SYMPTOMS
SORE THROAT: 1
COUGH: 0

## 2020-08-04 ENCOUNTER — TELEPHONE (OUTPATIENT)
Dept: FAMILY MEDICINE CLINIC | Age: 50
End: 2020-08-04

## 2020-09-22 ENCOUNTER — HOSPITAL ENCOUNTER (OUTPATIENT)
Dept: LAB | Age: 50
Discharge: HOME | End: 2020-09-22
Payer: COMMERCIAL

## 2020-09-22 ENCOUNTER — OFFICE VISIT (OUTPATIENT)
Dept: FAMILY MEDICINE CLINIC | Age: 50
End: 2020-09-22
Payer: COMMERCIAL

## 2020-09-22 ENCOUNTER — TELEPHONE (OUTPATIENT)
Dept: SURGERY | Age: 50
End: 2020-09-22

## 2020-09-22 VITALS
BODY MASS INDEX: 33.68 KG/M2 | WEIGHT: 183 LBS | HEART RATE: 68 BPM | TEMPERATURE: 97.2 F | DIASTOLIC BLOOD PRESSURE: 72 MMHG | HEIGHT: 62 IN | SYSTOLIC BLOOD PRESSURE: 120 MMHG

## 2020-09-22 LAB
ABSOLUTE EOS #: 0.35 K/UL (ref 0–0.44)
ABSOLUTE IMMATURE GRANULOCYTE: <0.03 K/UL (ref 0–0.3)
ABSOLUTE LYMPH #: 1.88 K/UL (ref 1.1–3.7)
ABSOLUTE MONO #: 0.54 K/UL (ref 0.1–1.2)
ANION GAP SERPL CALCULATED.3IONS-SCNC: 9 MMOL/L (ref 9–17)
BASOPHILS # BLD: 1 % (ref 0–2)
BASOPHILS ABSOLUTE: 0.04 K/UL (ref 0–0.2)
BUN BLDV-MCNC: 11 MG/DL (ref 6–20)
BUN/CREAT BLD: 17 (ref 9–20)
CALCIUM SERPL-MCNC: 9.3 MG/DL (ref 8.6–10.4)
CHLORIDE BLD-SCNC: 103 MMOL/L (ref 98–107)
CO2: 26 MMOL/L (ref 20–31)
CREAT SERPL-MCNC: 0.63 MG/DL (ref 0.5–0.9)
DIFFERENTIAL TYPE: ABNORMAL
EOSINOPHILS RELATIVE PERCENT: 4 % (ref 1–4)
ESTIMATED AVERAGE GLUCOSE: 137 MG/DL
GFR AFRICAN AMERICAN: >60 ML/MIN
GFR NON-AFRICAN AMERICAN: >60 ML/MIN
GFR SERPL CREATININE-BSD FRML MDRD: ABNORMAL ML/MIN/{1.73_M2}
GFR SERPL CREATININE-BSD FRML MDRD: ABNORMAL ML/MIN/{1.73_M2}
GLUCOSE BLD-MCNC: 144 MG/DL (ref 70–99)
HBA1C MFR BLD: 6.4 % (ref 4.8–5.9)
HCT VFR BLD CALC: 41 % (ref 36.3–47.1)
HEMOGLOBIN: 13.1 G/DL (ref 11.9–15.1)
IMMATURE GRANULOCYTES: 0 %
LYMPHOCYTES # BLD: 23 % (ref 24–43)
MCH RBC QN AUTO: 28.1 PG (ref 25.2–33.5)
MCHC RBC AUTO-ENTMCNC: 32 G/DL (ref 25.2–33.5)
MCV RBC AUTO: 88 FL (ref 82.6–102.9)
MONOCYTES # BLD: 7 % (ref 3–12)
NRBC AUTOMATED: 0 PER 100 WBC
PDW BLD-RTO: 13.7 % (ref 11.8–14.4)
PLATELET # BLD: 207 K/UL (ref 138–453)
PLATELET ESTIMATE: ABNORMAL
PMV BLD AUTO: 10.7 FL (ref 8.1–13.5)
POTASSIUM SERPL-SCNC: 4.8 MMOL/L (ref 3.7–5.3)
RBC # BLD: 4.66 M/UL (ref 3.95–5.11)
RBC # BLD: ABNORMAL 10*6/UL
SEG NEUTROPHILS: 65 % (ref 36–65)
SEGMENTED NEUTROPHILS ABSOLUTE COUNT: 5.44 K/UL (ref 1.5–8.1)
SODIUM BLD-SCNC: 138 MMOL/L (ref 135–144)
WBC # BLD: 8.3 K/UL (ref 3.5–11.3)
WBC # BLD: ABNORMAL 10*3/UL

## 2020-09-22 PROCEDURE — 83036 HEMOGLOBIN GLYCOSYLATED A1C: CPT

## 2020-09-22 PROCEDURE — 36415 COLL VENOUS BLD VENIPUNCTURE: CPT

## 2020-09-22 PROCEDURE — 80048 BASIC METABOLIC PNL TOTAL CA: CPT

## 2020-09-22 PROCEDURE — 85025 COMPLETE CBC W/AUTO DIFF WBC: CPT

## 2020-09-22 PROCEDURE — 99214 OFFICE O/P EST MOD 30 MIN: CPT | Performed by: FAMILY MEDICINE

## 2020-09-22 RX ORDER — METFORMIN HYDROCHLORIDE 750 MG/1
TABLET, EXTENDED RELEASE ORAL
Qty: 90 TABLET | Refills: 2 | Status: SHIPPED | OUTPATIENT
Start: 2020-09-22 | End: 2021-07-01

## 2020-09-22 ASSESSMENT — PATIENT HEALTH QUESTIONNAIRE - PHQ9
1. LITTLE INTEREST OR PLEASURE IN DOING THINGS: 0
2. FEELING DOWN, DEPRESSED OR HOPELESS: 0
SUM OF ALL RESPONSES TO PHQ QUESTIONS 1-9: 0
SUM OF ALL RESPONSES TO PHQ QUESTIONS 1-9: 0
SUM OF ALL RESPONSES TO PHQ9 QUESTIONS 1 & 2: 0

## 2020-09-22 ASSESSMENT — ENCOUNTER SYMPTOMS
ALLERGIC/IMMUNOLOGIC NEGATIVE: 1
RESPIRATORY NEGATIVE: 1
EYES NEGATIVE: 1
DIARRHEA: 1

## 2020-09-22 NOTE — TELEPHONE ENCOUNTER
Patient is due for screening colonoscopy. No symptoms. Dr. Jessica Martinez colonoscopy packet mailed.

## 2020-09-22 NOTE — PROGRESS NOTES
Subjective:      Patient ID: Zaida Levin is a 48 y.o. female. Diabetes   Pertinent negatives for diabetes include no polydipsia, no polyphagia and no polyuria. Routine follow up on chronic medical conditions, refills, and review of updated labs. bs elevated. She has gestational diabetes with both pregnancies requiring insulin. She feels well. No polydipsia or polyphagia. Wt. Fairly stable over the interval.   She has no acute concerns or symptoms on review. Father had diabetes as an adult. She is not checking bs at home. Taking glucophage 750mg daily. Compliant with medications. Some random diarrhea, tolerable. No asthma concerns. Past Medical History:   Diagnosis Date    Asthma     Diabetes mellitus type 2, uncomplicated ([address]     Elbow fracture 2017    Left elbow    Gestational diabetes     Obesity      Past Surgical History:   Procedure Laterality Date     SECTION      x2    TONSILLECTOMY       Current Outpatient Medications   Medication Sig Dispense Refill    albuterol sulfate  (90 Base) MCG/ACT inhaler Inhale 2 puffs into the lungs every 6 hours as needed for Wheezing 1 Inhaler 3    budesonide-formoterol (SYMBICORT) 80-4.5 MCG/ACT AERO Inhale 2 puffs into the lungs daily 1 Inhaler 5    metFORMIN (GLUCOPHAGE-XR) 750 MG extended release tablet TAKE ONE TABLET BY MOUTH DAILY WITH BREAKFAST 90 tablet 2     No current facility-administered medications for this visit. Allergies   Allergen Reactions    Bactrim [Sulfamethoxazole-Trimethoprim] Diarrhea         Review of Systems   Constitutional: Negative. HENT: Negative. Eyes: Negative. Respiratory: Negative. Cardiovascular: Negative. Gastrointestinal: Positive for diarrhea (mild, intermittent since starting glucophage. ). Endocrine: Negative for polydipsia, polyphagia and polyuria. Genitourinary: Negative. Musculoskeletal: Negative. Skin: Negative.     Allergic/Immunologic: Negative. Neurological: Negative. Hematological: Negative. Psychiatric/Behavioral: Negative. Objective:   Physical Exam  Constitutional:       General: She is not in acute distress. Appearance: She is well-developed. HENT:      Head: Normocephalic and atraumatic. Right Ear: External ear normal.      Left Ear: External ear normal.      Mouth/Throat:      Pharynx: No oropharyngeal exudate. Eyes:      General: No scleral icterus. Conjunctiva/sclera: Conjunctivae normal.   Neck:      Musculoskeletal: Neck supple. Thyroid: No thyromegaly. Cardiovascular:      Rate and Rhythm: Normal rate and regular rhythm. Heart sounds: Normal heart sounds. No murmur. Pulmonary:      Effort: Pulmonary effort is normal. No respiratory distress. Breath sounds: Normal breath sounds. No wheezing. Abdominal:      General: Bowel sounds are normal. There is no distension. Palpations: Abdomen is soft. Tenderness: There is no abdominal tenderness. There is no rebound. Musculoskeletal: Normal range of motion. General: No tenderness. Skin:     General: Skin is warm and dry. Findings: No erythema or rash. Neurological:      Mental Status: She is alert and oriented to person, place, and time. Psychiatric:         Behavior: Behavior normal.         Thought Content: Thought content normal.         Judgment: Judgment normal.       /72 (Site: Right Upper Arm, Position: Sitting, Cuff Size: Large Adult)   Pulse 68   Temp 97.2 °F (36.2 °C) (Temporal)   Ht 5' 2\" (1.575 m)   Wt 183 lb (83 kg)   BMI 33.47 kg/m²     up 1 lbs.    Results for orders placed or performed during the hospital encounter of 09/22/20   CBC Auto Differential   Result Value Ref Range    WBC 8.3 3.5 - 11.3 k/uL    RBC 4.66 3.95 - 5.11 m/uL    Hemoglobin 13.1 11.9 - 15.1 g/dL    Hematocrit 41.0 36.3 - 47.1 %    MCV 88.0 82.6 - 102.9 fL    MCH 28.1 25.2 - 33.5 pg    MCHC 32.0 25.2 - 33.5 g/dL RDW 13.7 11.8 - 14.4 %    Platelets 760 166 - 750 k/uL    MPV 10.7 8.1 - 13.5 fL    NRBC Automated 0.0 0.0 per 100 WBC    Differential Type NOT REPORTED     Seg Neutrophils 65 36 - 65 %    Lymphocytes 23 (L) 24 - 43 %    Monocytes 7 3 - 12 %    Eosinophils % 4 1 - 4 %    Basophils 1 0 - 2 %    Immature Granulocytes 0 0 %    Segs Absolute 5.44 1.50 - 8.10 k/uL    Absolute Lymph # 1.88 1.10 - 3.70 k/uL    Absolute Mono # 0.54 0.10 - 1.20 k/uL    Absolute Eos # 0.35 0.00 - 0.44 k/uL    Basophils Absolute 0.04 0.00 - 0.20 k/uL    Absolute Immature Granulocyte <0.03 0.00 - 0.30 k/uL    WBC Morphology NOT REPORTED     RBC Morphology NOT REPORTED     Platelet Estimate NOT REPORTED    Hemoglobin A1C   Result Value Ref Range    Hemoglobin A1C 6.4 (H) 4.8 - 5.9 %    Estimated Avg Glucose 137 mg/dL   Basic Metabolic Panel   Result Value Ref Range    Glucose 144 (H) 70 - 99 mg/dL    BUN 11 6 - 20 mg/dL    CREATININE 0.63 0.50 - 0.90 mg/dL    Bun/Cre Ratio 17 9 - 20    Calcium 9.3 8.6 - 10.4 mg/dL    Sodium 138 135 - 144 mmol/L    Potassium 4.8 3.7 - 5.3 mmol/L    Chloride 103 98 - 107 mmol/L    CO2 26 20 - 31 mmol/L    Anion Gap 9 9 - 17 mmol/L    GFR Non-African American >60 >60 mL/min    GFR African American >60 >60 mL/min    GFR Comment          GFR Staging NOT REPORTED        Assessment:       Encounter Diagnoses   Name Primary?  Type 2 diabetes mellitus without complication, without long-term current use of insulin (HCC) Yes    Obesity with body mass index 30 or greater     Mild intermittent asthma without complication     Encounter for screening mammogram for breast cancer     Encounter for screening colonoscopy              Plan:        Diabetes: doing well with diet and metformin. Mild diarrhea, tolerable at present. a1c improved 6.4--6.2%-6.2%--6.4%  . Stable. She defers on statin start. Her native ldl at 92 . Discussed annual retinal exam.  Up to date 12/23/19.    She was reminded to schedule annual eye exam with her eye specialist ~December. Obesity: improving with better diet. She would like to get down to 170 lbs. Planning to cont. Wt. Loss plan. Asthma: fairly quiescent at present. She has prn meds for use. Some recent cough and she started symbicort for awhile. No active wheezing.  symbicort working well but expensive for her. Planning to cont. Same. Updated pap and pelvic completed in [place] fall of 2019. March mammogram last year. Rec. Annual update. Due for inaugural colonoscopy screening, now at age 48.

## 2020-11-07 ENCOUNTER — HOSPITAL ENCOUNTER (OUTPATIENT)
Dept: MAMMOGRAPHY | Age: 50
Discharge: HOME | End: 2020-11-09
Payer: COMMERCIAL

## 2020-11-07 PROCEDURE — 77063 BREAST TOMOSYNTHESIS BI: CPT

## 2021-02-19 RX ORDER — DILTIAZEM HYDROCHLORIDE 60 MG/1
TABLET, FILM COATED ORAL
Qty: 10.2 G | Refills: 5 | Status: SHIPPED | OUTPATIENT
Start: 2021-02-19 | End: 2021-10-12 | Stop reason: SDUPTHER

## 2021-02-19 NOTE — TELEPHONE ENCOUNTER
Laura Aragon called requesting a refill of the below medication which has been pended for you:     Requested Prescriptions     Pending Prescriptions Disp Refills    SYMBICORT 80-4.5 MCG/ACT AERO [Pharmacy Med Name: Sanjay Mood 80-4.5 MCG INHALER] 10.2 g 4     Sig: INHALE TWO PUFFS BY MOUTH DAILY       Last Appointment Date: 9/22/2020  Next Appointment Date: 3/23/2021    Allergies   Allergen Reactions    Bactrim [Sulfamethoxazole-Trimethoprim] Diarrhea

## 2021-02-23 ENCOUNTER — TELEPHONE (OUTPATIENT)
Dept: FAMILY MEDICINE CLINIC | Age: 51
End: 2021-02-23

## 2021-02-23 NOTE — TELEPHONE ENCOUNTER
Prior auth completed and Symbicort was approved. [id number];  Status:Approved; Review Type:Prior Auth; Coverage Start Date:01/24/2021;   Coverage End Date:02/23/2024    Patient notified

## 2021-04-12 ENCOUNTER — HOSPITAL ENCOUNTER (OUTPATIENT)
Dept: LAB | Age: 51
Discharge: HOME | End: 2021-04-12
Payer: COMMERCIAL

## 2021-04-12 ENCOUNTER — OFFICE VISIT (OUTPATIENT)
Dept: FAMILY MEDICINE CLINIC | Age: 51
End: 2021-04-12
Payer: COMMERCIAL

## 2021-04-12 VITALS
RESPIRATION RATE: 18 BRPM | DIASTOLIC BLOOD PRESSURE: 74 MMHG | SYSTOLIC BLOOD PRESSURE: 112 MMHG | HEIGHT: 62 IN | WEIGHT: 183.8 LBS | BODY MASS INDEX: 33.82 KG/M2 | HEART RATE: 80 BPM

## 2021-04-12 DIAGNOSIS — E66.9 OBESITY WITH BODY MASS INDEX 30 OR GREATER: ICD-10-CM

## 2021-04-12 DIAGNOSIS — E11.9 TYPE 2 DIABETES MELLITUS WITHOUT COMPLICATION, WITHOUT LONG-TERM CURRENT USE OF INSULIN (HCC): Primary | ICD-10-CM

## 2021-04-12 DIAGNOSIS — E11.9 TYPE 2 DIABETES MELLITUS WITHOUT COMPLICATION, WITHOUT LONG-TERM CURRENT USE OF INSULIN (HCC): ICD-10-CM

## 2021-04-12 DIAGNOSIS — Z12.11 ENCOUNTER FOR SCREENING COLONOSCOPY: ICD-10-CM

## 2021-04-12 DIAGNOSIS — J45.20 MILD INTERMITTENT ASTHMA WITHOUT COMPLICATION: ICD-10-CM

## 2021-04-12 LAB
ABSOLUTE EOS #: 0.4 K/UL (ref 0–0.44)
ABSOLUTE IMMATURE GRANULOCYTE: 0.04 K/UL (ref 0–0.3)
ABSOLUTE LYMPH #: 1.89 K/UL (ref 1.1–3.7)
ABSOLUTE MONO #: 0.52 K/UL (ref 0.1–1.2)
ANION GAP SERPL CALCULATED.3IONS-SCNC: 11 MMOL/L (ref 9–17)
BASOPHILS # BLD: 1 % (ref 0–2)
BASOPHILS ABSOLUTE: 0.04 K/UL (ref 0–0.2)
BUN BLDV-MCNC: 11 MG/DL (ref 6–20)
BUN/CREAT BLD: 15 (ref 9–20)
CALCIUM SERPL-MCNC: 9.4 MG/DL (ref 8.6–10.4)
CHLORIDE BLD-SCNC: 106 MMOL/L (ref 98–107)
CO2: 24 MMOL/L (ref 20–31)
CREAT SERPL-MCNC: 0.75 MG/DL (ref 0.5–0.9)
DIFFERENTIAL TYPE: ABNORMAL
EOSINOPHILS RELATIVE PERCENT: 5 % (ref 1–4)
ESTIMATED AVERAGE GLUCOSE: 134 MG/DL
GFR AFRICAN AMERICAN: >60 ML/MIN
GFR NON-AFRICAN AMERICAN: >60 ML/MIN
GFR SERPL CREATININE-BSD FRML MDRD: ABNORMAL ML/MIN/{1.73_M2}
GFR SERPL CREATININE-BSD FRML MDRD: ABNORMAL ML/MIN/{1.73_M2}
GLUCOSE BLD-MCNC: 162 MG/DL (ref 70–99)
HBA1C MFR BLD: 6.3 % (ref 4–6)
HCT VFR BLD CALC: 41.6 % (ref 36.3–47.1)
HEMOGLOBIN: 13.3 G/DL (ref 11.9–15.1)
IMMATURE GRANULOCYTES: 1 %
LYMPHOCYTES # BLD: 23 % (ref 24–43)
MCH RBC QN AUTO: 28.4 PG (ref 25.2–33.5)
MCHC RBC AUTO-ENTMCNC: 32 G/DL (ref 25.2–33.5)
MCV RBC AUTO: 88.7 FL (ref 82.6–102.9)
MONOCYTES # BLD: 6 % (ref 3–12)
NRBC AUTOMATED: 0 PER 100 WBC
PDW BLD-RTO: 13.2 % (ref 11.8–14.4)
PLATELET # BLD: 202 K/UL (ref 138–453)
PLATELET ESTIMATE: ABNORMAL
PMV BLD AUTO: 10.7 FL (ref 8.1–13.5)
POTASSIUM SERPL-SCNC: 4.9 MMOL/L (ref 3.7–5.3)
RBC # BLD: 4.69 M/UL (ref 3.95–5.11)
RBC # BLD: ABNORMAL 10*6/UL
SEG NEUTROPHILS: 64 % (ref 36–65)
SEGMENTED NEUTROPHILS ABSOLUTE COUNT: 5.42 K/UL (ref 1.5–8.1)
SODIUM BLD-SCNC: 141 MMOL/L (ref 135–144)
WBC # BLD: 8.3 K/UL (ref 3.5–11.3)
WBC # BLD: ABNORMAL 10*3/UL

## 2021-04-12 PROCEDURE — 80048 BASIC METABOLIC PNL TOTAL CA: CPT

## 2021-04-12 PROCEDURE — 36415 COLL VENOUS BLD VENIPUNCTURE: CPT

## 2021-04-12 PROCEDURE — 83036 HEMOGLOBIN GLYCOSYLATED A1C: CPT

## 2021-04-12 PROCEDURE — 99214 OFFICE O/P EST MOD 30 MIN: CPT | Performed by: FAMILY MEDICINE

## 2021-04-12 PROCEDURE — 85025 COMPLETE CBC W/AUTO DIFF WBC: CPT

## 2021-04-12 ASSESSMENT — ENCOUNTER SYMPTOMS
DIARRHEA: 1
EYES NEGATIVE: 1
RESPIRATORY NEGATIVE: 1
ALLERGIC/IMMUNOLOGIC NEGATIVE: 1

## 2021-04-12 ASSESSMENT — PATIENT HEALTH QUESTIONNAIRE - PHQ9
1. LITTLE INTEREST OR PLEASURE IN DOING THINGS: 0
2. FEELING DOWN, DEPRESSED OR HOPELESS: 0
SUM OF ALL RESPONSES TO PHQ QUESTIONS 1-9: 0
SUM OF ALL RESPONSES TO PHQ QUESTIONS 1-9: 0

## 2021-04-12 NOTE — PROGRESS NOTES
Subjective:      Patient ID: Farhad Arreaga is a 48 y.o. female. Diabetes  Pertinent negatives for diabetes include no polydipsia, no polyphagia and no polyuria. Routine follow up on chronic medical conditions, refills, and review of updated labs. bs elevated. She has gestational diabetes with both pregnancies requiring insulin. She feels well. No polydipsia or polyphagia. Wt. Fairly stable over the interval.   She has no acute concerns or symptoms on review. Father had diabetes as an adult. She is not checking bs at home. Taking glucophage 750mg daily. Compliant with medications. Some random diarrhea, tolerable. No asthma concerns. Has used rescue inhaler a couple of times. Only using symbicort 3-4 times/week. Overall stable. Past Medical History:   Diagnosis Date    Asthma     Diabetes mellitus type 2, uncomplicated ([address]     Elbow fracture 2017    Left elbow    Gestational diabetes     Obesity      Past Surgical History:   Procedure Laterality Date     SECTION      x2    TONSILLECTOMY       Current Outpatient Medications   Medication Sig Dispense Refill    SYMBICORT 80-4.5 MCG/ACT AERO INHALE TWO PUFFS BY MOUTH DAILY 10.2 g 5    metFORMIN (GLUCOPHAGE-XR) 750 MG extended release tablet 1 po daily 90 tablet 2    albuterol sulfate  (90 Base) MCG/ACT inhaler Inhale 2 puffs into the lungs every 6 hours as needed for Wheezing 1 Inhaler 3     No current facility-administered medications for this visit. Allergies   Allergen Reactions    Bactrim [Sulfamethoxazole-Trimethoprim] Diarrhea         Review of Systems   Constitutional: Negative. HENT: Negative. Eyes: Negative. Respiratory: Negative. Cardiovascular: Negative. Gastrointestinal: Positive for diarrhea (mild, intermittent since starting glucophage. ). Endocrine: Negative for polydipsia, polyphagia and polyuria. Genitourinary: Negative. Musculoskeletal: Negative. Skin: Negative. 3.7 - 5.3 mmol/L    Chloride 106 98 - 107 mmol/L    CO2 24 20 - 31 mmol/L    Anion Gap 11 9 - 17 mmol/L    GFR Non-African American >60 >60 mL/min    GFR African American >60 >60 mL/min    GFR Comment          GFR Staging NOT REPORTED    CBC Auto Differential   Result Value Ref Range    WBC 8.3 3.5 - 11.3 k/uL    RBC 4.69 3.95 - 5.11 m/uL    Hemoglobin 13.3 11.9 - 15.1 g/dL    Hematocrit 41.6 36.3 - 47.1 %    MCV 88.7 82.6 - 102.9 fL    MCH 28.4 25.2 - 33.5 pg    MCHC 32.0 25.2 - 33.5 g/dL    RDW 13.2 11.8 - 14.4 %    Platelets 002 773 - 459 k/uL    MPV 10.7 8.1 - 13.5 fL    NRBC Automated 0.0 0.0 per 100 WBC    Differential Type NOT REPORTED     Seg Neutrophils 64 36 - 65 %    Lymphocytes 23 (L) 24 - 43 %    Monocytes 6 3 - 12 %    Eosinophils % 5 (H) 1 - 4 %    Basophils 1 0 - 2 %    Immature Granulocytes 1 (H) 0 %    Segs Absolute 5.42 1.50 - 8.10 k/uL    Absolute Lymph # 1.89 1.10 - 3.70 k/uL    Absolute Mono # 0.52 0.10 - 1.20 k/uL    Absolute Eos # 0.40 0.00 - 0.44 k/uL    Basophils Absolute 0.04 0.00 - 0.20 k/uL    Absolute Immature Granulocyte 0.04 0.00 - 0.30 k/uL    WBC Morphology NOT REPORTED     RBC Morphology NOT REPORTED     Platelet Estimate NOT REPORTED        Assessment:       Encounter Diagnoses   Name Primary?  Type 2 diabetes mellitus without complication, without long-term current use of insulin ([facility]) Yes    Obesity with body mass index 30 or greater     Mild intermittent asthma without complication     Encounter for screening colonoscopy                Plan:        Diabetes: doing well with diet and metformin. Mild diarrhea, tolerable at present. a1c improved 6.4--6.2%-6.2%--6.4%  . Stable. She defers on statin start. Her native ldl at 92 . Discussed annual retinal exam.  Up to date 12/23/19. She was reminded to schedule annual eye exam with her eye specialist.     Obesity: improving with better diet. She would like to get down to 170 lbs. Planning to cont. Wt. Loss plan. Asthma: fairly quiescent at present. She has prn meds for use. Some recent cough and she started symbicort for awhile. No active wheezing.  symbicort working well but expensive for her. Planning to cont. Same. Updated pap and pelvic completed in [place] fall of 2019. Mammogram normal 11/7/2020. Rec. Annual update. Due for inaugural colonoscopy screening, now at age 48. She has the paper work, planning to schedule this summer.

## 2021-07-01 RX ORDER — METFORMIN HYDROCHLORIDE 750 MG/1
TABLET, EXTENDED RELEASE ORAL
Qty: 90 TABLET | Refills: 1 | Status: SHIPPED | OUTPATIENT
Start: 2021-07-01 | End: 2021-10-12 | Stop reason: SDUPTHER

## 2021-07-01 NOTE — TELEPHONE ENCOUNTER
Nereyda Lane called requesting a refill of the below medication which has been pended for you:     Requested Prescriptions     Pending Prescriptions Disp Refills    metFORMIN (GLUCOPHAGE-XR) 750 MG extended release tablet [Pharmacy Med Name: metFORMIN HCL  MG TABLET] 90 tablet 1     Sig: TAKE ONE TABLET BY MOUTH DAILY       Last Appointment Date: 4/12/2021  Next Appointment Date: 10/12/2021    Allergies   Allergen Reactions    Bactrim [Sulfamethoxazole-Trimethoprim] Diarrhea

## 2021-07-22 ENCOUNTER — OFFICE VISIT (OUTPATIENT)
Dept: OPTOMETRY | Age: 51
End: 2021-07-22
Payer: COMMERCIAL

## 2021-07-22 DIAGNOSIS — H52.4 HYPEROPIA OF BOTH EYES WITH ASTIGMATISM AND PRESBYOPIA: ICD-10-CM

## 2021-07-22 DIAGNOSIS — H52.03 HYPEROPIA OF BOTH EYES WITH ASTIGMATISM AND PRESBYOPIA: ICD-10-CM

## 2021-07-22 DIAGNOSIS — H52.203 HYPEROPIA OF BOTH EYES WITH ASTIGMATISM AND PRESBYOPIA: ICD-10-CM

## 2021-07-22 DIAGNOSIS — H53.8 BLURRED VISION, BILATERAL: ICD-10-CM

## 2021-07-22 DIAGNOSIS — E11.9 NON-INSULIN DEPENDENT TYPE 2 DIABETES MELLITUS (HCC): Primary | ICD-10-CM

## 2021-07-22 PROCEDURE — 92250 FUNDUS PHOTOGRAPHY W/I&R: CPT | Performed by: OPTOMETRIST

## 2021-07-22 PROCEDURE — 99214 OFFICE O/P EST MOD 30 MIN: CPT | Performed by: OPTOMETRIST

## 2021-07-22 RX ORDER — TROPICAMIDE 10 MG/ML
1 SOLUTION/ DROPS OPHTHALMIC ONCE
Status: COMPLETED | OUTPATIENT
Start: 2021-07-22 | End: 2021-07-22

## 2021-07-22 RX ADMIN — TROPICAMIDE 1 DROP: 10 SOLUTION/ DROPS OPHTHALMIC at 14:07

## 2021-07-22 ASSESSMENT — REFRACTION_MANIFEST
OD_ADD: +2.00
OS_CYLINDER: DS
OS_ADD: +2.00
OD_SPHERE: +0.50
OD_CYLINDER: DS
OS_SPHERE: +0.50

## 2021-07-22 ASSESSMENT — REFRACTION_WEARINGRX
OD_CYLINDER: DS
SPECS_TYPE: RRX
OD_SPHERE: +2.25
OS_CYLINDER: DS
OS_SPHERE: +2.25

## 2021-07-22 ASSESSMENT — VISUAL ACUITY
OD_SC+: -1
METHOD: SNELLEN - LINEAR
OD_CC: 20/20 OU
OD_SC: 20/25
OS_SC: 20/25

## 2021-07-22 ASSESSMENT — TONOMETRY
IOP_METHOD: NON-CONTACT AIR PUFF
OD_IOP_MMHG: 27
OS_IOP_MMHG: 23

## 2021-07-22 ASSESSMENT — KERATOMETRY
OD_K2POWER_DIOPTERS: 44.50
OS_K1POWER_DIOPTERS: 44.50
OD_AXISANGLE_DEGREES: 087
OS_K2POWER_DIOPTERS: 45.25
OS_AXISANGLE_DEGREES: 087
OD_K1POWER_DIOPTERS: 44.25
OS_AXISANGLE2_DEGREES: 177
METHOD_AUTO_MANUAL: AUTOMATED
OD_AXISANGLE2_DEGREES: 177

## 2021-07-22 ASSESSMENT — ENCOUNTER SYMPTOMS
EYES NEGATIVE: 0
RESPIRATORY NEGATIVE: 0
ALLERGIC/IMMUNOLOGIC NEGATIVE: 0
GASTROINTESTINAL NEGATIVE: 0

## 2021-07-22 ASSESSMENT — SLIT LAMP EXAM - LIDS
COMMENTS: NORMAL
COMMENTS: NORMAL

## 2021-07-22 NOTE — PROGRESS NOTES
Thelma Pal presents today for   Chief Complaint   Patient presents with    Blurred Vision    Ophth Diabetic Exam    Vision Exam   .    HPI     Blurred Vision     In both eyes. Vision is blurred. Context:  distance vision and reading. Comments     Last Vision Exam: 12/23/2019 AW  Last Ophthalmology Exam: n/a  Last Filled Glasses Rx: 12/23/2019 RRX  Insurance: [employer]  Update: Dm Exam; Glasses  Reading is getting more blurry ; distance is not so bad  Diabetic:  Sugars:    HmgA1C: 6.3  04/2021                   Current Outpatient Medications   Medication Sig Dispense Refill    metFORMIN (GLUCOPHAGE-XR) 750 MG extended release tablet TAKE ONE TABLET BY MOUTH DAILY 90 tablet 1    SYMBICORT 80-4.5 MCG/ACT AERO INHALE TWO PUFFS BY MOUTH DAILY 10.2 g 5    albuterol sulfate  (90 Base) MCG/ACT inhaler Inhale 2 puffs into the lungs every 6 hours as needed for Wheezing 1 Inhaler 3     No current facility-administered medications for this visit. ROS     Positive for: Endocrine    Negative for: Constitutional, Gastrointestinal, Neurological, Skin, Genitourinary, Musculoskeletal, HENT, Cardiovascular, Eyes, Respiratory, Psychiatric, Allergic/Imm, Heme/Lymph          Family History   Problem Relation Age of Onset    Emphysema Mother     Heart Failure Mother     Heart Disease Mother     Stroke Mother     Cancer Father         lung cancer    Cancer Sister         melanoma    Heart Disease Maternal Grandfather     Scoliosis Daughter     Cataracts Neg Hx     Glaucoma Neg Hx     Diabetes Neg Hx      Social History     Socioeconomic History    Marital status:      Spouse name: None    Number of children: None    Years of education: None    Highest education level: None   Occupational History    None   Tobacco Use    Smoking status: Never Smoker    Smokeless tobacco: Never Used   Substance and Sexual Activity    Alcohol use: Yes     Comment: occassionally    Drug use:  No  Sexual activity: None   Other Topics Concern    None   Social History Narrative    None     Social Determinants of Health     Financial Resource Strain:     Difficulty of Paying Living Expenses:    Food Insecurity:     Worried About Running Out of Food in the Last Year:     [address] in the Last Year:    Transportation Needs:     Lack of Transportation (Medical):      Lack of Transportation (Non-Medical):    Physical Activity:     Days of Exercise per Week:     Minutes of Exercise per Session:    Stress:     Feeling of Stress :    Social Connections:     Frequency of Communication with Friends and Family:     Frequency of Social Gatherings with Friends and Family:     Attends [employer] Services:     Active Member of Clubs or Organizations:     Attends Club or Organization Meetings:     Marital Status:    Intimate Partner Violence:     Fear of Current or Ex-Partner:     Emotionally Abused:     Physically Abused:     Sexually Abused:        Past Medical History:   Diagnosis Date    Asthma     Diabetes mellitus type 2, uncomplicated ([facility] [address]     Elbow fracture 2017    Left elbow    Gestational diabetes     Obesity          Main Ophthalmology Exam     External Exam       Right Left    External Normal Normal          Slit Lamp Exam       Right Left    Lids/Lashes Normal Normal    Conjunctiva/Sclera White and quiet White and quiet    Cornea Clear Clear    Anterior Chamber Deep and quiet Deep and quiet    Iris Round and reactive Round and reactive    Lens Clear Clear    Vitreous Normal Normal          Fundus Exam       Right Left    Disc Normal Normal    C/D Ratio 0.2 0.2    Macula Normal Normal    Vessels Normal Normal    Periphery Normal Normal                  Tonometry     Tonometry (Non-contact air puff, 2:22 PM)       Right Left    Pressure 27 23   IOP.3             24.0  CH:  10.0          10.6  WS: 8.0          3.9                   Visual Acuity (Snellen - Linear) Right Left    Dist sc 20/25 -1 20/25    Near cc 20/20 OU         Keratometry     Keratometry (Automated)       K1 Axis K2 Axis    Right 44.25 177 44.50 087    Left 44.50 177 45.25 087              Pupils     Pupils       Pupils    Right PERRL    Left PERRL               Not recorded         Not recorded          Ophthalmology Exam     Wearing Rx       Sphere Cylinder Add    Right +2.25 ds     Left +2.25 ds     Age: 2yrs    Type: RRX           Wearing Rx #2       Sphere Cylinder Add    Right +0.50 ds +1.75    Left +0.50 ds +1.75    Age: 2yrs    Type: NOT FILLED                Manifest Refraction     Manifest Refraction       Sphere Cylinder Axis Dist VA Add    Right +0.50 ds  20/20 +2.00    Left +0.50 ds  20/20 +2.00          Manifest Refraction #2 (Auto)       Sphere Cylinder Axis Dist VA Add    Right +1.00 -0.25 058      Left +0.75 0.00 000                 Final Rx       Sphere Cylinder Dist VA Add    Right +0.50 ds 20/20 +2.00    Left +0.50 ds 20/20 +2.00      Final Rx #2       Sphere Cylinder Dist VA Add    Right +2.50 ds 20/20     Left +2.50 ds 20/20     Type: RRX     Expiration Date: 7/23/2023          Neuro/Psych     Neuro/Psych     Oriented x3: Yes    Mood/Affect: Normal                Orders Placed This Encounter   Procedures     DIABETES EYE EXAM    Color Fundus Photography-OU-Both Eyes       IMPRESSION:  1. Non-insulin dependent type 2 diabetes mellitus ([address]    2. Blurred vision, bilateral    3. Hyperopia of both eyes with astigmatism and presbyopia        PLAN:    Discussed the patient's diagnosis of diabetes and the impact this can have on their ocular health, potentially even leading to permanent blindness. I discussed with the patient the importance of continued follow-up and management with their primary care physician to control their glycemic, blood pressure, and lipid levels. The patient verbalized understanding. 2. New glasses recommended for near only   3.  \"       Glycemic control as per PCP   There are no Patient Instructions on file for this visit.    Return in about 1 year (around 7/22/2022) for complete eye exam.

## 2021-10-12 ENCOUNTER — HOSPITAL ENCOUNTER (OUTPATIENT)
Dept: LAB | Age: 51
Discharge: HOME | End: 2021-10-12
Payer: COMMERCIAL

## 2021-10-12 ENCOUNTER — OFFICE VISIT (OUTPATIENT)
Dept: FAMILY MEDICINE CLINIC | Age: 51
End: 2021-10-12
Payer: COMMERCIAL

## 2021-10-12 VITALS
DIASTOLIC BLOOD PRESSURE: 64 MMHG | SYSTOLIC BLOOD PRESSURE: 118 MMHG | BODY MASS INDEX: 33.86 KG/M2 | WEIGHT: 184 LBS | HEIGHT: 62 IN | HEART RATE: 72 BPM

## 2021-10-12 DIAGNOSIS — E66.9 OBESITY WITH BODY MASS INDEX 30 OR GREATER: ICD-10-CM

## 2021-10-12 DIAGNOSIS — E11.9 TYPE 2 DIABETES MELLITUS WITHOUT COMPLICATION, WITHOUT LONG-TERM CURRENT USE OF INSULIN (HCC): Primary | ICD-10-CM

## 2021-10-12 DIAGNOSIS — Z12.11 ENCOUNTER FOR SCREENING COLONOSCOPY: ICD-10-CM

## 2021-10-12 DIAGNOSIS — J45.20 MILD INTERMITTENT ASTHMA WITHOUT COMPLICATION: ICD-10-CM

## 2021-10-12 DIAGNOSIS — E11.9 TYPE 2 DIABETES MELLITUS WITHOUT COMPLICATION, WITHOUT LONG-TERM CURRENT USE OF INSULIN (HCC): ICD-10-CM

## 2021-10-12 LAB
ABSOLUTE EOS #: 0.33 K/UL (ref 0–0.44)
ABSOLUTE IMMATURE GRANULOCYTE: 0.03 K/UL (ref 0–0.3)
ABSOLUTE LYMPH #: 1.35 K/UL (ref 1.1–3.7)
ABSOLUTE MONO #: 0.57 K/UL (ref 0.1–1.2)
ALBUMIN SERPL-MCNC: 4.4 G/DL (ref 3.5–5.2)
ALBUMIN/GLOBULIN RATIO: 1.4 (ref 1–2.5)
ALP BLD-CCNC: 74 U/L (ref 35–104)
ALT SERPL-CCNC: 10 U/L (ref 5–33)
ANION GAP SERPL CALCULATED.3IONS-SCNC: 10 MMOL/L (ref 9–17)
AST SERPL-CCNC: 11 U/L
BASOPHILS # BLD: 1 % (ref 0–2)
BASOPHILS ABSOLUTE: 0.04 K/UL (ref 0–0.2)
BILIRUB SERPL-MCNC: 0.28 MG/DL (ref 0.3–1.2)
BUN BLDV-MCNC: 10 MG/DL (ref 6–20)
BUN/CREAT BLD: 16 (ref 9–20)
CALCIUM SERPL-MCNC: 9.2 MG/DL (ref 8.6–10.4)
CHLORIDE BLD-SCNC: 105 MMOL/L (ref 98–107)
CHOLESTEROL/HDL RATIO: 3.4
CHOLESTEROL: 165 MG/DL
CO2: 23 MMOL/L (ref 20–31)
CREAT SERPL-MCNC: 0.64 MG/DL (ref 0.5–0.9)
CREATININE URINE: 235.6 MG/DL (ref 28–217)
DIFFERENTIAL TYPE: ABNORMAL
EOSINOPHILS RELATIVE PERCENT: 5 % (ref 1–4)
ESTIMATED AVERAGE GLUCOSE: 137 MG/DL
GFR AFRICAN AMERICAN: >60 ML/MIN
GFR NON-AFRICAN AMERICAN: >60 ML/MIN
GFR SERPL CREATININE-BSD FRML MDRD: ABNORMAL ML/MIN/{1.73_M2}
GFR SERPL CREATININE-BSD FRML MDRD: ABNORMAL ML/MIN/{1.73_M2}
GLUCOSE BLD-MCNC: 162 MG/DL (ref 70–99)
HBA1C MFR BLD: 6.4 % (ref 4–6)
HCT VFR BLD CALC: 41.2 % (ref 36.3–47.1)
HDLC SERPL-MCNC: 48 MG/DL
HEMOGLOBIN: 13.2 G/DL (ref 11.9–15.1)
IMMATURE GRANULOCYTES: 0 %
LDL CHOLESTEROL: 92 MG/DL (ref 0–130)
LYMPHOCYTES # BLD: 20 % (ref 24–43)
MCH RBC QN AUTO: 27.9 PG (ref 25.2–33.5)
MCHC RBC AUTO-ENTMCNC: 32 G/DL (ref 25.2–33.5)
MCV RBC AUTO: 87.1 FL (ref 82.6–102.9)
MICROALBUMIN/CREAT 24H UR: <12 MG/L
MICROALBUMIN/CREAT UR-RTO: ABNORMAL MCG/MG CREAT
MONOCYTES # BLD: 9 % (ref 3–12)
NRBC AUTOMATED: 0 PER 100 WBC
PDW BLD-RTO: 13.4 % (ref 11.8–14.4)
PLATELET # BLD: 200 K/UL (ref 138–453)
PLATELET ESTIMATE: ABNORMAL
PMV BLD AUTO: 10.4 FL (ref 8.1–13.5)
POTASSIUM SERPL-SCNC: 4.6 MMOL/L (ref 3.7–5.3)
RBC # BLD: 4.73 M/UL (ref 3.95–5.11)
RBC # BLD: ABNORMAL 10*6/UL
SEG NEUTROPHILS: 65 % (ref 36–65)
SEGMENTED NEUTROPHILS ABSOLUTE COUNT: 4.36 K/UL (ref 1.5–8.1)
SODIUM BLD-SCNC: 138 MMOL/L (ref 135–144)
TOTAL PROTEIN: 7.6 G/DL (ref 6.4–8.3)
TRIGL SERPL-MCNC: 125 MG/DL
VLDLC SERPL CALC-MCNC: NORMAL MG/DL (ref 1–30)
WBC # BLD: 6.7 K/UL (ref 3.5–11.3)
WBC # BLD: ABNORMAL 10*3/UL

## 2021-10-12 PROCEDURE — 80061 LIPID PANEL: CPT

## 2021-10-12 PROCEDURE — 99214 OFFICE O/P EST MOD 30 MIN: CPT | Performed by: FAMILY MEDICINE

## 2021-10-12 PROCEDURE — 36415 COLL VENOUS BLD VENIPUNCTURE: CPT

## 2021-10-12 PROCEDURE — 82570 ASSAY OF URINE CREATININE: CPT

## 2021-10-12 PROCEDURE — 85025 COMPLETE CBC W/AUTO DIFF WBC: CPT

## 2021-10-12 PROCEDURE — 83036 HEMOGLOBIN GLYCOSYLATED A1C: CPT

## 2021-10-12 PROCEDURE — 82043 UR ALBUMIN QUANTITATIVE: CPT

## 2021-10-12 PROCEDURE — 80053 COMPREHEN METABOLIC PANEL: CPT

## 2021-10-12 RX ORDER — METFORMIN HYDROCHLORIDE 750 MG/1
TABLET, EXTENDED RELEASE ORAL
Qty: 90 TABLET | Refills: 1 | Status: SHIPPED | OUTPATIENT
Start: 2021-10-12 | End: 2022-04-12 | Stop reason: SDUPTHER

## 2021-10-12 RX ORDER — BUDESONIDE AND FORMOTEROL FUMARATE DIHYDRATE 80; 4.5 UG/1; UG/1
AEROSOL RESPIRATORY (INHALATION)
Qty: 10.2 G | Refills: 5 | Status: SHIPPED | OUTPATIENT
Start: 2021-10-12 | End: 2022-04-12 | Stop reason: SDUPTHER

## 2021-10-12 ASSESSMENT — ENCOUNTER SYMPTOMS
EYES NEGATIVE: 1
RESPIRATORY NEGATIVE: 1
DIARRHEA: 1
ALLERGIC/IMMUNOLOGIC NEGATIVE: 1

## 2021-10-12 ASSESSMENT — PATIENT HEALTH QUESTIONNAIRE - PHQ9
SUM OF ALL RESPONSES TO PHQ QUESTIONS 1-9: 0
SUM OF ALL RESPONSES TO PHQ QUESTIONS 1-9: 0
1. LITTLE INTEREST OR PLEASURE IN DOING THINGS: 0
2. FEELING DOWN, DEPRESSED OR HOPELESS: 0
SUM OF ALL RESPONSES TO PHQ9 QUESTIONS 1 & 2: 0
SUM OF ALL RESPONSES TO PHQ QUESTIONS 1-9: 0

## 2021-10-12 NOTE — PROGRESS NOTES
Subjective:      Patient ID: Yonny Covert is a 46 y.o. female. Diabetes  Pertinent negatives for diabetes include no polydipsia, no polyphagia and no polyuria. Routine follow up on chronic medical conditions, refills, and review of updated labs. She had gestational diabetes with both pregnancies requiring insulin. She feels well. No polydipsia or polyphagia. Wt. Fairly stable over the interval.   She has no acute concerns or symptoms on review. Father had diabetes as an adult. She is not checking bs at home. Taking glucophage 750mg daily. Compliant with medications. Some intermittent diarrhea, tolerable. No asthma concerns. Has used rescue inhaler a couple of times. Only using symbicort 3-4 times/week. Overall stable. She is finding the minimum effective dose. Past Medical History:   Diagnosis Date    Asthma     Diabetes mellitus type 2, uncomplicated ([address]     Elbow fracture 2017    Left elbow    Gestational diabetes     Obesity      Past Surgical History:   Procedure Laterality Date     SECTION      x2    TONSILLECTOMY       Current Outpatient Medications   Medication Sig Dispense Refill    metFORMIN (GLUCOPHAGE-XR) 750 MG extended release tablet TAKE ONE TABLET BY MOUTH DAILY 90 tablet 1    SYMBICORT 80-4.5 MCG/ACT AERO INHALE TWO PUFFS BY MOUTH DAILY 10.2 g 5    albuterol sulfate  (90 Base) MCG/ACT inhaler Inhale 2 puffs into the lungs every 6 hours as needed for Wheezing 1 Inhaler 3     No current facility-administered medications for this visit. Allergies   Allergen Reactions    Bactrim [Sulfamethoxazole-Trimethoprim] Diarrhea         Review of Systems   Constitutional: Negative. HENT: Negative. Eyes: Negative. Respiratory: Negative. Cardiovascular: Negative. Gastrointestinal: Positive for diarrhea (mild, intermittent since starting glucophage. ). Endocrine: Negative for polydipsia, polyphagia and polyuria.    Genitourinary: Negative. Musculoskeletal: Negative. Skin: Negative. Allergic/Immunologic: Negative. Neurological: Negative. Hematological: Negative. Psychiatric/Behavioral: Negative. Objective:   Physical Exam  Constitutional:       General: She is not in acute distress. Appearance: She is well-developed. HENT:      Head: Normocephalic and atraumatic. Right Ear: External ear normal.      Left Ear: External ear normal.      Mouth/Throat:      Pharynx: No oropharyngeal exudate. Eyes:      General: No scleral icterus. Conjunctiva/sclera: Conjunctivae normal.   Neck:      Thyroid: No thyromegaly. Cardiovascular:      Rate and Rhythm: Normal rate and regular rhythm. Heart sounds: Normal heart sounds. No murmur heard. Pulmonary:      Effort: Pulmonary effort is normal. No respiratory distress. Breath sounds: Normal breath sounds. No wheezing. Abdominal:      General: Bowel sounds are normal. There is no distension. Palpations: Abdomen is soft. Tenderness: There is no abdominal tenderness. There is no rebound. Musculoskeletal:         General: No tenderness. Normal range of motion. Cervical back: Neck supple. Skin:     General: Skin is warm and dry. Findings: No erythema or rash. Neurological:      Mental Status: She is alert and oriented to person, place, and time. Psychiatric:         Behavior: Behavior normal.         Thought Content: Thought content normal.         Judgment: Judgment normal.       /64 (Site: Right Upper Arm, Position: Sitting, Cuff Size: Large Adult)   Pulse 72   Ht 5' 2\" (1.575 m)   Wt 184 lb (83.5 kg)   BMI 33.65 kg/m²     wt. Stable.      Results for orders placed or performed during the hospital encounter of 10/12/21   CBC Auto Differential   Result Value Ref Range    WBC 6.7 3.5 - 11.3 k/uL    RBC 4.73 3.95 - 5.11 m/uL    Hemoglobin 13.2 11.9 - 15.1 g/dL    Hematocrit 41.2 36.3 - 47.1 %    MCV 87.1 82.6 - 102.9 fL MCH 27.9 25.2 - 33.5 pg    MCHC 32.0 25.2 - 33.5 g/dL    RDW 13.4 11.8 - 14.4 %    Platelets 627 744 - 372 k/uL    MPV 10.4 8.1 - 13.5 fL    NRBC Automated 0.0 0.0 per 100 WBC    Differential Type NOT REPORTED     Seg Neutrophils 65 36 - 65 %    Lymphocytes 20 (L) 24 - 43 %    Monocytes 9 3 - 12 %    Eosinophils % 5 (H) 1 - 4 %    Basophils 1 0 - 2 %    Immature Granulocytes 0 0 %    Segs Absolute 4.36 1.50 - 8.10 k/uL    Absolute Lymph # 1.35 1.10 - 3.70 k/uL    Absolute Mono # 0.57 0.10 - 1.20 k/uL    Absolute Eos # 0.33 0.00 - 0.44 k/uL    Basophils Absolute 0.04 0.00 - 0.20 k/uL    Absolute Immature Granulocyte 0.03 0.00 - 0.30 k/uL    WBC Morphology NOT REPORTED     RBC Morphology NOT REPORTED     Platelet Estimate NOT REPORTED    Comprehensive Metabolic Panel   Result Value Ref Range    Glucose 162 (H) 70 - 99 mg/dL    BUN 10 6 - 20 mg/dL    CREATININE 0.64 0.50 - 0.90 mg/dL    Bun/Cre Ratio 16 9 - 20    Calcium 9.2 8.6 - 10.4 mg/dL    Sodium 138 135 - 144 mmol/L    Potassium 4.6 3.7 - 5.3 mmol/L    Chloride 105 98 - 107 mmol/L    CO2 23 20 - 31 mmol/L    Anion Gap 10 9 - 17 mmol/L    Alkaline Phosphatase 74 35 - 104 U/L    ALT 10 5 - 33 U/L    AST 11 <32 U/L    Total Bilirubin 0.28 (L) 0.3 - 1.2 mg/dL    Total Protein 7.6 6.4 - 8.3 g/dL    Albumin 4.4 3.5 - 5.2 g/dL    Albumin/Globulin Ratio 1.4 1.0 - 2.5    GFR Non-African American >60 >60 mL/min    GFR African American >60 >60 mL/min    GFR Comment          GFR Staging NOT REPORTED        Assessment:       Encounter Diagnoses   Name Primary?  Type 2 diabetes mellitus without complication, without long-term current use of insulin (HCC) Yes    Obesity with body mass index 30 or greater     Mild intermittent asthma without complication     Encounter for screening colonoscopy                Plan:        Diabetes: doing well with diet and metformin. Mild diarrhea, tolerable at present. a1c improved 6.4--6.2%-6.2%--6.4%  .  Some Updated labs pending due to being sent [facility] to [facility] today   She defers on statin start. Her native ldl at 92 . Discussed annual retinal exam.  Up to date 07/22/21 with holly clemons. No evidence for retinopathy. Obesity: stable. Wt. Loss slowed at present. She would like to get down to 170 lbs. Planning to cont. Wt. Loss plan. Asthma: fairly quiescent at present. She has prn meds for use. Some recent cough and she started symbicort for awhile. No active wheezing.  symbicort working well but expensive for her. Planning to cont. Same. Finding the lowest dose of symbicort that keeps symptoms in check. Updated pap and pelvic completed in [place] fall of 2019. Mammogram normal 11/7/2020. Rec. Annual update. Due for inaugural colonoscopy screening, now at age 48. She has the paper work, planning to schedule this summer. She relates she has not been able to get a hold of her insurance company. Always on a wait list she doesn't have time for.   Form recommended calling for pre approval.  Will see if we can help pre approval.

## 2021-10-12 NOTE — PATIENT INSTRUCTIONS
Hospital Outpatient Visit on 10/12/2021   Component Date Value Ref Range Status    WBC 10/12/2021 6.7  3.5 - 11.3 k/uL Final    RBC 10/12/2021 4.73  3.95 - 5.11 m/uL Final    Hemoglobin 10/12/2021 13.2  11.9 - 15.1 g/dL Final    Hematocrit 10/12/2021 41.2  36.3 - 47.1 % Final    MCV 10/12/2021 87.1  82.6 - 102.9 fL Final    MCH 10/12/2021 27.9  25.2 - 33.5 pg Final    MCHC 10/12/2021 32.0  25.2 - 33.5 g/dL Final    RDW 10/12/2021 13.4  11.8 - 14.4 % Final    Platelets 36/84/6512 200  138 - 453 k/uL Final    MPV 10/12/2021 10.4  8.1 - 13.5 fL Final    NRBC Automated 10/12/2021 0.0  0.0 per 100 WBC Final    Differential Type 10/12/2021 NOT REPORTED   Final    Seg Neutrophils 10/12/2021 65  36 - 65 % Final    Lymphocytes 10/12/2021 20* 24 - 43 % Final    Monocytes 10/12/2021 9  3 - 12 % Final    Eosinophils % 10/12/2021 5* 1 - 4 % Final    Basophils 10/12/2021 1  0 - 2 % Final    Immature Granulocytes 10/12/2021 0  0 % Final    Segs Absolute 10/12/2021 4.36  1.50 - 8.10 k/uL Final    Absolute Lymph # 10/12/2021 1.35  1.10 - 3.70 k/uL Final    Absolute Mono # 10/12/2021 0.57  0.10 - 1.20 k/uL Final    Absolute Eos # 10/12/2021 0.33  0.00 - 0.44 k/uL Final    Basophils Absolute 10/12/2021 0.04  0.00 - 0.20 k/uL Final    Absolute Immature Granulocyte 10/12/2021 0.03  0.00 - 0.30 k/uL Final    WBC Morphology 10/12/2021 NOT REPORTED   Final    RBC Morphology 10/12/2021 NOT REPORTED   Final    Platelet Estimate 40/34/1209 NOT REPORTED   Final    Glucose 10/12/2021 162* 70 - 99 mg/dL Final    BUN 10/12/2021 10  6 - 20 mg/dL Final    CREATININE 10/12/2021 0.64  0.50 - 0.90 mg/dL Final    Bun/Cre Ratio 10/12/2021 16  9 - 20 Final    Calcium 10/12/2021 9.2  8.6 - 10.4 mg/dL Final    Sodium 10/12/2021 138  135 - 144 mmol/L Final    Potassium 10/12/2021 4.6  3.7 - 5.3 mmol/L Final    Chloride 10/12/2021 105  98 - 107 mmol/L Final    CO2 10/12/2021 23  20 - 31 mmol/L Final    Anion Gap 10/12/2021 10  9 - 17 mmol/L Final    Alkaline Phosphatase 10/12/2021 74  35 - 104 U/L Final    ALT 10/12/2021 10  5 - 33 U/L Final    AST 10/12/2021 11  <32 U/L Final    Total Bilirubin 10/12/2021 0.28* 0.3 - 1.2 mg/dL Final    Total Protein 10/12/2021 7.6  6.4 - 8.3 g/dL Final    Albumin 10/12/2021 4.4  3.5 - 5.2 g/dL Final    Albumin/Globulin Ratio 10/12/2021 1.4  1.0 - 2.5 Final    GFR Non- 10/12/2021 >60  >60 mL/min Final    GFR  10/12/2021 >60  >60 mL/min Final    GFR Comment 10/12/2021        Final    Comment: Average GFR for 52-63 years old:   80 mL/min/1.73sq m  Chronic Kidney Disease:   <60 mL/min/1.73sq m  Kidney failure:   <15 mL/min/1.73sq m              eGFR calculated using average adult body mass.  Additional eGFR calculator available at:        [employer]            GFR Staging 10/12/2021 NOT REPORTED   Final

## 2021-12-06 ENCOUNTER — TELEPHONE (OUTPATIENT)
Dept: SURGERY | Age: 51
End: 2021-12-06

## 2022-01-31 RX ORDER — ALBUTEROL SULFATE 90 UG/1
2 AEROSOL, METERED RESPIRATORY (INHALATION) EVERY 6 HOURS PRN
Qty: 1 EACH | Refills: 1 | Status: SHIPPED | OUTPATIENT
Start: 2022-01-31

## 2022-01-31 NOTE — TELEPHONE ENCOUNTER
Quintin Gaucher called requesting a refill of the below medication which has been pended for you:     Requested Prescriptions     Pending Prescriptions Disp Refills    albuterol sulfate  (90 Base) MCG/ACT inhaler 1 each 1     Sig: Inhale 2 puffs into the lungs every 6 hours as needed for Wheezing       Last Appointment Date: 10/12/2021  Next Appointment Date: 4/12/2022    Allergies   Allergen Reactions    Bactrim [Sulfamethoxazole-Trimethoprim] Diarrhea

## 2022-03-28 DIAGNOSIS — Z12.31 VISIT FOR SCREENING MAMMOGRAM: Primary | ICD-10-CM

## 2022-04-09 ENCOUNTER — HOSPITAL ENCOUNTER (OUTPATIENT)
Dept: MAMMOGRAPHY | Age: 52
Discharge: HOME | End: 2022-04-11
Payer: COMMERCIAL

## 2022-04-09 DIAGNOSIS — Z12.31 VISIT FOR SCREENING MAMMOGRAM: ICD-10-CM

## 2022-04-09 PROCEDURE — 77063 BREAST TOMOSYNTHESIS BI: CPT

## 2022-04-12 ENCOUNTER — HOSPITAL ENCOUNTER (OUTPATIENT)
Dept: LAB | Age: 52
Discharge: HOME | End: 2022-04-12
Payer: COMMERCIAL

## 2022-04-12 ENCOUNTER — TELEPHONE (OUTPATIENT)
Dept: SURGERY | Age: 52
End: 2022-04-12

## 2022-04-12 ENCOUNTER — OFFICE VISIT (OUTPATIENT)
Dept: FAMILY MEDICINE CLINIC | Age: 52
End: 2022-04-12
Payer: COMMERCIAL

## 2022-04-12 VITALS
HEIGHT: 62 IN | HEART RATE: 68 BPM | DIASTOLIC BLOOD PRESSURE: 72 MMHG | WEIGHT: 184 LBS | SYSTOLIC BLOOD PRESSURE: 124 MMHG | BODY MASS INDEX: 33.86 KG/M2

## 2022-04-12 DIAGNOSIS — J45.20 MILD INTERMITTENT ASTHMA WITHOUT COMPLICATION: ICD-10-CM

## 2022-04-12 DIAGNOSIS — E11.9 TYPE 2 DIABETES MELLITUS WITHOUT COMPLICATION, WITHOUT LONG-TERM CURRENT USE OF INSULIN (HCC): ICD-10-CM

## 2022-04-12 DIAGNOSIS — Z12.11 ENCOUNTER FOR SCREENING COLONOSCOPY: Primary | ICD-10-CM

## 2022-04-12 DIAGNOSIS — E66.9 OBESITY WITH BODY MASS INDEX 30 OR GREATER: ICD-10-CM

## 2022-04-12 LAB
ABSOLUTE EOS #: 0.32 K/UL (ref 0–0.44)
ABSOLUTE IMMATURE GRANULOCYTE: <0.03 K/UL (ref 0–0.3)
ABSOLUTE LYMPH #: 1.77 K/UL (ref 1.1–3.7)
ABSOLUTE MONO #: 0.47 K/UL (ref 0.1–1.2)
ANION GAP SERPL CALCULATED.3IONS-SCNC: 9 MMOL/L (ref 9–17)
BASOPHILS # BLD: 1 % (ref 0–2)
BASOPHILS ABSOLUTE: 0.04 K/UL (ref 0–0.2)
BUN BLDV-MCNC: 17 MG/DL (ref 6–20)
BUN/CREAT BLD: 25 (ref 9–20)
CALCIUM SERPL-MCNC: 9.2 MG/DL (ref 8.6–10.4)
CHLORIDE BLD-SCNC: 105 MMOL/L (ref 98–107)
CO2: 26 MMOL/L (ref 20–31)
CREAT SERPL-MCNC: 0.67 MG/DL (ref 0.5–0.9)
EOSINOPHILS RELATIVE PERCENT: 5 % (ref 1–4)
GFR AFRICAN AMERICAN: >60 ML/MIN
GFR NON-AFRICAN AMERICAN: >60 ML/MIN
GFR SERPL CREATININE-BSD FRML MDRD: ABNORMAL ML/MIN/{1.73_M2}
GLUCOSE BLD-MCNC: 142 MG/DL (ref 70–99)
HCT VFR BLD CALC: 41.4 % (ref 36.3–47.1)
HEMOGLOBIN: 13.4 G/DL (ref 11.9–15.1)
IMMATURE GRANULOCYTES: 0 %
LYMPHOCYTES # BLD: 26 % (ref 24–43)
MCH RBC QN AUTO: 27.9 PG (ref 25.2–33.5)
MCHC RBC AUTO-ENTMCNC: 32.4 G/DL (ref 25.2–33.5)
MCV RBC AUTO: 86.1 FL (ref 82.6–102.9)
MONOCYTES # BLD: 7 % (ref 3–12)
NRBC AUTOMATED: 0 PER 100 WBC
PDW BLD-RTO: 13.1 % (ref 11.8–14.4)
PLATELET # BLD: 221 K/UL (ref 138–453)
PMV BLD AUTO: 10.6 FL (ref 8.1–13.5)
POTASSIUM SERPL-SCNC: 4.6 MMOL/L (ref 3.7–5.3)
RBC # BLD: 4.81 M/UL (ref 3.95–5.11)
SEG NEUTROPHILS: 61 % (ref 36–65)
SEGMENTED NEUTROPHILS ABSOLUTE COUNT: 4.18 K/UL (ref 1.5–8.1)
SODIUM BLD-SCNC: 140 MMOL/L (ref 135–144)
WBC # BLD: 6.8 K/UL (ref 3.5–11.3)

## 2022-04-12 PROCEDURE — 36415 COLL VENOUS BLD VENIPUNCTURE: CPT

## 2022-04-12 PROCEDURE — 83036 HEMOGLOBIN GLYCOSYLATED A1C: CPT

## 2022-04-12 PROCEDURE — 85025 COMPLETE CBC W/AUTO DIFF WBC: CPT

## 2022-04-12 PROCEDURE — 80048 BASIC METABOLIC PNL TOTAL CA: CPT

## 2022-04-12 PROCEDURE — 99214 OFFICE O/P EST MOD 30 MIN: CPT | Performed by: FAMILY MEDICINE

## 2022-04-12 RX ORDER — BUDESONIDE AND FORMOTEROL FUMARATE DIHYDRATE 80; 4.5 UG/1; UG/1
AEROSOL RESPIRATORY (INHALATION)
Qty: 10.2 G | Refills: 5 | Status: SHIPPED | OUTPATIENT
Start: 2022-04-12

## 2022-04-12 RX ORDER — METFORMIN HYDROCHLORIDE 750 MG/1
TABLET, EXTENDED RELEASE ORAL
Qty: 90 TABLET | Refills: 1 | Status: SHIPPED | OUTPATIENT
Start: 2022-04-12

## 2022-04-12 ASSESSMENT — ENCOUNTER SYMPTOMS
EYES NEGATIVE: 1
ALLERGIC/IMMUNOLOGIC NEGATIVE: 1
DIARRHEA: 1
RESPIRATORY NEGATIVE: 1

## 2022-04-12 NOTE — PATIENT INSTRUCTIONS
Hospital Outpatient Visit on 04/12/2022   Component Date Value Ref Range Status    Glucose 04/12/2022 142* 70 - 99 mg/dL Final    BUN 04/12/2022 17  6 - 20 mg/dL Final    CREATININE 04/12/2022 0.67  0.50 - 0.90 mg/dL Final    Bun/Cre Ratio 04/12/2022 25* 9 - 20 Final    Calcium 04/12/2022 9.2  8.6 - 10.4 mg/dL Final    Sodium 04/12/2022 140  135 - 144 mmol/L Final    Potassium 04/12/2022 4.6  3.7 - 5.3 mmol/L Final    Chloride 04/12/2022 105  98 - 107 mmol/L Final    CO2 04/12/2022 26  20 - 31 mmol/L Final    Anion Gap 04/12/2022 9  9 - 17 mmol/L Final    GFR Non- 04/12/2022 >60  >60 mL/min Final    GFR  04/12/2022 >60  >60 mL/min Final    GFR Comment 04/12/2022        Final    Comment: Average GFR for 52-63 years old:   80 mL/min/1.73sq m  Chronic Kidney Disease:   <60 mL/min/1.73sq m  Kidney failure:   <15 mL/min/1.73sq m              GFR is a calculated value that has proven clinically to  be a more effective measure of   kidney function when reported with serum creatinine.             WBC 04/12/2022 6.8  3.5 - 11.3 k/uL Final    RBC 04/12/2022 4.81  3.95 - 5.11 m/uL Final    Hemoglobin 04/12/2022 13.4  11.9 - 15.1 g/dL Final    Hematocrit 04/12/2022 41.4  36.3 - 47.1 % Final    MCV 04/12/2022 86.1  82.6 - 102.9 fL Final    MCH 04/12/2022 27.9  25.2 - 33.5 pg Final    MCHC 04/12/2022 32.4  25.2 - 33.5 g/dL Final    RDW 04/12/2022 13.1  11.8 - 14.4 % Final    Platelets 79/36/7996 221  138 - 453 k/uL Final    MPV 04/12/2022 10.6  8.1 - 13.5 fL Final    NRBC Automated 04/12/2022 0.0  0.0 per 100 WBC Final    Seg Neutrophils 04/12/2022 61  36 - 65 % Final    Lymphocytes 04/12/2022 26  24 - 43 % Final    Monocytes 04/12/2022 7  3 - 12 % Final    Eosinophils % 04/12/2022 5* 1 - 4 % Final    Basophils 04/12/2022 1  0 - 2 % Final    Immature Granulocytes 04/12/2022 0  0 % Final    Segs Absolute 04/12/2022 4.18  1.50 - 8.10 k/uL Final    Absolute Lymph # 04/12/2022 1.77  1.10 - 3.70 k/uL Final    Absolute Mono # 04/12/2022 0.47  0.10 - 1.20 k/uL Final    Absolute Eos # 04/12/2022 0.32  0.00 - 0.44 k/uL Final    Basophils Absolute 04/12/2022 0.04  0.00 - 0.20 k/uL Final    Absolute Immature Granulocyte 04/12/2022 <0.03  0.00 - 0.30 k/uL Final

## 2022-04-12 NOTE — PROGRESS NOTES
Subjective:      Patient ID: Jin Gagnon is a 46 y.o. female. Diabetes  Pertinent negatives for diabetes include no polydipsia, no polyphagia and no polyuria. Routine follow up on chronic medical conditions, refills, and review of updated labs. She had gestational diabetes with both pregnancies requiring insulin. She feels well. No polydipsia or polyphagia. Wt. Fairly stable over the interval.   She has no acute concerns or symptoms on review. Father had diabetes as an adult. She is not checking bs at home. Taking glucophage 750mg daily. Compliant with medications. Some intermittent diarrhea, tolerable. No asthma concerns. Has not needed rescue inhaler over 6 months. Only using symbicort 2  times/week. She has decreased the dose to minimal effective range. Overall stable. She is finding the minimum effective dose. Has done this long term. Past Medical History:   Diagnosis Date    Asthma     Diabetes mellitus type 2, uncomplicated ([facility] [address]     Elbow fracture 2017    Left elbow    Gestational diabetes     Obesity      Past Surgical History:   Procedure Laterality Date     SECTION      x2    TONSILLECTOMY       Current Outpatient Medications   Medication Sig Dispense Refill    albuterol sulfate  (90 Base) MCG/ACT inhaler Inhale 2 puffs into the lungs every 6 hours as needed for Wheezing 1 each 1    metFORMIN (GLUCOPHAGE-XR) 750 MG extended release tablet TAKE ONE TABLET BY MOUTH DAILY 90 tablet 1    budesonide-formoterol (SYMBICORT) 80-4.5 MCG/ACT AERO INHALE TWO PUFFS BY MOUTH DAILY 10.2 g 5     No current facility-administered medications for this visit. Allergies   Allergen Reactions    Bactrim [Sulfamethoxazole-Trimethoprim] Diarrhea         Review of Systems   Constitutional: Negative. HENT: Negative. Eyes: Negative. Respiratory: Negative. Cardiovascular: Negative.     Gastrointestinal: Positive for diarrhea (mild, intermittent since starting glucophage. ). Endocrine: Negative for polydipsia, polyphagia and polyuria. Genitourinary: Negative. Musculoskeletal: Negative. Skin: Negative. Allergic/Immunologic: Negative. Neurological: Negative. Hematological: Negative. Psychiatric/Behavioral: Negative. Objective:   Physical Exam  Constitutional:       General: She is not in acute distress. Appearance: She is well-developed. HENT:      Head: Normocephalic and atraumatic. Right Ear: External ear normal.      Left Ear: External ear normal.      Mouth/Throat:      Pharynx: No oropharyngeal exudate. Eyes:      General: No scleral icterus. Conjunctiva/sclera: Conjunctivae normal.   Neck:      Thyroid: No thyromegaly. Cardiovascular:      Rate and Rhythm: Normal rate and regular rhythm. Heart sounds: Normal heart sounds. No murmur heard. Pulmonary:      Effort: Pulmonary effort is normal. No respiratory distress. Breath sounds: Normal breath sounds. No wheezing. Abdominal:      General: Bowel sounds are normal. There is no distension. Palpations: Abdomen is soft. Tenderness: There is no abdominal tenderness. There is no rebound. Musculoskeletal:         General: No tenderness. Normal range of motion. Cervical back: Neck supple. Skin:     General: Skin is warm and dry. Findings: No erythema or rash. Neurological:      Mental Status: She is alert and oriented to person, place, and time. Psychiatric:         Behavior: Behavior normal.         Thought Content: Thought content normal.         Judgment: Judgment normal.       /72 (Site: Left Upper Arm, Position: Sitting, Cuff Size: Large Adult)   Pulse 68   Ht 5' 2\" (1.575 m)   Wt 184 lb (83.5 kg)   BMI 33.65 kg/m²     wt. Stable.      Results for orders placed or performed during the hospital encounter of 41/49/42   Basic Metabolic Panel   Result Value Ref Range    Glucose 142 (H) 70 - 99 mg/dL    BUN 17 6 - 20 mg/dL    CREATININE 0.67 0.50 - 0.90 mg/dL    Bun/Cre Ratio 25 (H) 9 - 20    Calcium 9.2 8.6 - 10.4 mg/dL    Sodium 140 135 - 144 mmol/L    Potassium 4.6 3.7 - 5.3 mmol/L    Chloride 105 98 - 107 mmol/L    CO2 26 20 - 31 mmol/L    Anion Gap 9 9 - 17 mmol/L    GFR Non-African American >60 >60 mL/min    GFR African American >60 >60 mL/min    GFR Comment         CBC Auto Differential   Result Value Ref Range    WBC 6.8 3.5 - 11.3 k/uL    RBC 4.81 3.95 - 5.11 m/uL    Hemoglobin 13.4 11.9 - 15.1 g/dL    Hematocrit 41.4 36.3 - 47.1 %    MCV 86.1 82.6 - 102.9 fL    MCH 27.9 25.2 - 33.5 pg    MCHC 32.4 25.2 - 33.5 g/dL    RDW 13.1 11.8 - 14.4 %    Platelets 317 574 - 143 k/uL    MPV 10.6 8.1 - 13.5 fL    NRBC Automated 0.0 0.0 per 100 WBC    Seg Neutrophils 61 36 - 65 %    Lymphocytes 26 24 - 43 %    Monocytes 7 3 - 12 %    Eosinophils % 5 (H) 1 - 4 %    Basophils 1 0 - 2 %    Immature Granulocytes 0 0 %    Segs Absolute 4.18 1.50 - 8.10 k/uL    Absolute Lymph # 1.77 1.10 - 3.70 k/uL    Absolute Mono # 0.47 0.10 - 1.20 k/uL    Absolute Eos # 0.32 0.00 - 0.44 k/uL    Basophils Absolute 0.04 0.00 - 0.20 k/uL    Absolute Immature Granulocyte <0.03 0.00 - 0.30 k/uL       Assessment:       Encounter Diagnoses   Name Primary?  Type 2 diabetes mellitus without complication, without long-term current use of insulin (HCC)     Obesity with body mass index 30 or greater     Mild intermittent asthma without complication     Encounter for screening colonoscopy Yes           Plan:        Diabetes: doing well with diet and metformin. Mild diarrhea, tolerable at present. a1c  6.4--6.2%-6.2%--6.4%  . a1c pending [facility]   She defers on statin start. Her native ldl at 92 . Discussed annual retinal exam.  Up to date 07/22/21 with holly clemons. No evidence for retinopathy. Reminder for annual eye exam.      Obesity: stable. Wt. Loss slowed at present. She would like to get down to 170 lbs. Planning to cont. Wt. Loss plan. She is stable at 184 over the last year. Asthma: fairly quiescent at present. She has prn meds for use. Some recent cough and she started symbicort for awhile. No active wheezing.  symbicort working well but expensive for her. Planning to cont. Same. Finding the lowest dose of symbicort that keeps symptoms in check. Down to 2 days a week and remaining stable. Updated pap and pelvic completed in [place] fall of 2019. Mammogram 04/09/22. Rec. Annual update. Due for inaugural colonoscopy screening, now at age 48. She has the paper work, planning to schedule this summer. She relates she has not been able to get a hold of her insurance company. Always on a wait list she doesn't have time for. Form recommended calling for pre approval.  Will see if we can help pre approval.  She switched insurance over the interval. Still trying to get this scheduled again this visit.

## 2022-04-12 NOTE — TELEPHONE ENCOUNTER
[facility]. Patient is due for screening colonoscopy, no symptoms. Patient requested Dr. Pawan Dickerson, colonoscopy paperwork mailed.

## 2022-04-13 LAB
ESTIMATED AVERAGE GLUCOSE: 143 MG/DL
HBA1C MFR BLD: 6.6 % (ref 4–6)

## 2022-08-04 ENCOUNTER — OFFICE VISIT (OUTPATIENT)
Dept: OPTOMETRY | Age: 52
End: 2022-08-04
Payer: COMMERCIAL

## 2022-08-04 VITALS — SYSTOLIC BLOOD PRESSURE: 130 MMHG | DIASTOLIC BLOOD PRESSURE: 88 MMHG

## 2022-08-04 DIAGNOSIS — H53.8 BLURRED VISION, BILATERAL: ICD-10-CM

## 2022-08-04 DIAGNOSIS — H52.203 HYPEROPIA OF BOTH EYES WITH ASTIGMATISM AND PRESBYOPIA: ICD-10-CM

## 2022-08-04 DIAGNOSIS — H52.4 HYPEROPIA OF BOTH EYES WITH ASTIGMATISM AND PRESBYOPIA: ICD-10-CM

## 2022-08-04 DIAGNOSIS — H52.03 HYPEROPIA OF BOTH EYES WITH ASTIGMATISM AND PRESBYOPIA: ICD-10-CM

## 2022-08-04 DIAGNOSIS — E11.9 NON-INSULIN DEPENDENT TYPE 2 DIABETES MELLITUS (HCC): Primary | ICD-10-CM

## 2022-08-04 PROCEDURE — 99214 OFFICE O/P EST MOD 30 MIN: CPT | Performed by: OPTOMETRIST

## 2022-08-04 PROCEDURE — 92015 DETERMINE REFRACTIVE STATE: CPT | Performed by: OPTOMETRIST

## 2022-08-04 PROCEDURE — 92250 FUNDUS PHOTOGRAPHY W/I&R: CPT | Performed by: OPTOMETRIST

## 2022-08-04 PROCEDURE — 3044F HG A1C LEVEL LT 7.0%: CPT | Performed by: OPTOMETRIST

## 2022-08-04 RX ORDER — TROPICAMIDE 10 MG/ML
1 SOLUTION/ DROPS OPHTHALMIC ONCE
Status: COMPLETED | OUTPATIENT
Start: 2022-08-04 | End: 2022-08-04

## 2022-08-04 RX ADMIN — TROPICAMIDE 1 DROP: 10 SOLUTION/ DROPS OPHTHALMIC at 11:22

## 2022-08-04 ASSESSMENT — SLIT LAMP EXAM - LIDS
COMMENTS: NORMAL
COMMENTS: NORMAL

## 2022-08-04 ASSESSMENT — REFRACTION_MANIFEST
OS_SPHERE: +0.75
OS_ADD: +2.25
OD_CYLINDER: DS
OS_CYLINDER: DS
OD_SPHERE: +0.75
OD_ADD: +2.25

## 2022-08-04 ASSESSMENT — VISUAL ACUITY
OS_SC+: -1
METHOD: SNELLEN - LINEAR
OD_PH_SC: 20/25 OU
OD_SC: 20/25
OS_SC: 20/25
OD_SC: 20/40 OU

## 2022-08-04 ASSESSMENT — TONOMETRY
OD_IOP_MMHG: 24
OS_IOP_MMHG: 21
IOP_METHOD: NON-CONTACT AIR PUFF

## 2022-08-04 ASSESSMENT — REFRACTION_WEARINGRX
OS_SPHERE: +0.50
OD_ADD: +2.00
OS_CYLINDER: DS
OD_SPHERE: +0.50
OS_ADD: +2.00
OD_CYLINDER: DS

## 2022-08-04 NOTE — PROGRESS NOTES
diabetes     Obesity          Main Ophthalmology Exam       External Exam         Right Left    External Normal Normal              Slit Lamp Exam         Right Left    Lids/Lashes Normal Normal    Conjunctiva/Sclera White and quiet White and quiet    Cornea Clear Clear    Anterior Chamber Deep and quiet Deep and quiet    Iris Round and reactive Round and reactive    Lens Trace Nuclear sclerosis Trace Nuclear sclerosis    Vitreous Central vitreous floaters Normal              Fundus Exam         Right Left    Disc Normal Normal    C/D Ratio 0.2 0.2    Macula Normal Normal    Vessels Normal Normal    Periphery Normal Normal                   <div id=\"MAIN_EXAM_REVIEWED\"></div>     Tonometry       Tonometry (Non-contact air puff, 10:37 AM)         Right Left    Pressure 24 21   IOP.8              23.1  CH:  10.6          11.5  WS: 8.2          7.8                     Visual Acuity (Snellen - Linear)         Right Left    Dist sc 20/25 20/25 -1    Dist ph sc 20/25 OU     Near sc 20/40 OU    Patient uses OTC readers.        Not recorded       Pupils       Pupils         Pupils    Right PERRL    Left PERRL                  Not recorded       Not recorded         Ophthalmology Exam       Wearing Rx         Sphere Cylinder Add    Right +0.50 ds +2.00    Left +0.50 ds +2.00                    Manifest Refraction       Manifest Refraction         Sphere Cylinder Axis Dist VA Add    Right +0.75 ds  20/20 +2.25    Left +0.75 ds  20/20 +2.25              Manifest Refraction #2 (Auto)         Sphere Cylinder Axis Dist VA Add    Right +1.25 -0.25 052      Left +1.00 -0.25 11                     Final Rx         Sphere Cylinder Dist VA Add    Right +0.75 ds 20/20 +2.25    Left +0.75 ds 20/20 +2.25          Final Rx #2         Sphere Cylinder Dist VA Add    Right +3.00 ds      Left +3.00 ds        Type: RRX     Expiration Date: 2024            Neuro/Psych       Neuro/Psych       Oriented x3: Yes    Mood/Affect: Normal Orders Placed This Encounter   Procedures     DIABETES EYE EXAM    Color Fundus Photography-OU-Both Eyes       IMPRESSION:  1. Non-insulin dependent type 2 diabetes mellitus ([address]    2. Blurred vision, bilateral    3. Hyperopia of both eyes with astigmatism and presbyopia        PLAN:    Discussed the patient's diagnosis of diabetes and the impact this can have on their ocular health, potentially even leading to permanent blindness. I discussed with the patient the importance of continued follow-up and management with their primary care physician to control their glycemic, blood pressure, and lipid levels. The patient verbalized understanding. 3.  New glasses recommended       Glycemic control as per PCP   There are no Patient Instructions on file for this visit.    Return in about 1 year (around 8/4/2023) for complete eye exam.

## 2022-10-13 ENCOUNTER — TELEPHONE (OUTPATIENT)
Dept: FAMILY MEDICINE CLINIC | Age: 52
End: 2022-10-13

## 2022-10-13 DIAGNOSIS — E66.9 OBESITY WITH BODY MASS INDEX 30 OR GREATER: ICD-10-CM

## 2022-10-13 DIAGNOSIS — E11.9 TYPE 2 DIABETES MELLITUS WITHOUT COMPLICATION, WITHOUT LONG-TERM CURRENT USE OF INSULIN (HCC): Primary | ICD-10-CM

## 2022-10-13 NOTE — TELEPHONE ENCOUNTER
----- Message from Shona Alberto sent at 10/13/2022  8:13 AM EDT -----  Subject: Referral Request    Reason for referral request? Pt is coming in for her 6 mo follow up   12/28/22, needs diabetic bloodwork. Should she fast and get bloodwork done   early that same morning? Please advise. Provider patient wants to be referred to(if known):     Provider Phone Number(if known):     Additional Information for Provider?   ---------------------------------------------------------------------------  --------------  [id number]    [phone]; OK to leave message on voicemail  ---------------------------------------------------------------------------  --------------

## 2023-04-03 RX ORDER — METFORMIN HYDROCHLORIDE 750 MG/1
TABLET, EXTENDED RELEASE ORAL
Qty: 90 TABLET | Refills: 1 | Status: SHIPPED | OUTPATIENT
Start: 2023-04-03

## 2023-05-04 ENCOUNTER — TELEPHONE (OUTPATIENT)
Dept: FAMILY MEDICINE CLINIC | Age: 53
End: 2023-05-04

## 2023-05-04 DIAGNOSIS — Z12.31 VISIT FOR SCREENING MAMMOGRAM: Primary | ICD-10-CM

## 2023-05-04 NOTE — TELEPHONE ENCOUNTER
2018       [facility]: Patty Beckett  [facility]  [address]  [place] [address]     Dear Colleague,    Thank you for referring your patient, Patty Beckett, to the [facility]. Please see a copy of my visit note below.        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 No GI/ symptoms. No abdominal pain. No new rashes or lesions.   Planning to being released from senior living this month. Feels very ready to leave      Oncology History:  Ms Beckett had heavy bleeding 10/2017     11/24/17: ECC and endometrium curettage; pathology consult by [place] done 1/3/18  A. ENDOCERVIX, CURETTAGE:   - Invasive squamous cell carcinoma, moderately differentiated,   keratinizing type     B. ENDOMETRIUM, CURETTAGE:   - Invasive squamous cell carcinoma, moderately differentiated,   keratinizing type     17: PET CT     17: U/S head/neck/thyroid       12/15/17: FNA left neck LN       18: CT ap IMPRESSION:   1. Heterogeneously enhancing cervical mass extends superiorly to involve the lower uterine segment. This hypermetabolic lesion is better delineated on comparison PET CT. CT evaluation for parametrial invasion is limited, though no karen parametrial involvement is seen. No definite evidence of invasion to the bladder and rectum. If definitive staging is required, consider dedicated MRI.  2. Numerous enlarged centrally necrotic pelvic lymph nodes, as well as aortocaval and precaval nodes, which showed FDG activity on prior PET CT.  3. New indeterminate 4 cm left ovarian cyst. Dedicated pelvic ultrasound could be considered if indicated    18:  Day 1 weekly cisplatin and EBRT   18: last day of  Order needed and placed weekly cisplatin  2/21/18: zoie sleeve insertion      Radiation Oncology - Course: 1  Treatment Site Dose Modality From  To Elapsed Days Fx   Pelvis and PANs 4,550 cGy 06 X 1/16/2018 2/22/2018 37 26   Cervix 2,750 cGy Implant  2/26/18 3/7/2018 9 5     Dose per Fraction: 175 cGy EBRT, 550 cGy HDR  Total Dose: Equivalent dose D90 HRCTV 83.3 Gy    6/7/18: PET whole body   Largest in the left base  measures 11 mm and demonstrates increased metabolic activity with  maximum SUV of 2.29. Redemonstration of hypermetabolic nodule in the  left lobe of the thyroid, with maximum SUV of 6.35; on CT the lesion  measures 12 mm and is hypodense.  IMPRESSION:   In this patient with invasive squamous cell carcinoma of the  endocervix:  1. Significant decrease in size and metabolic activity of endocervical  lesion since 12/8/2017.  2. New bilateral lung nodules, consistent with metastatic disease.  3. Hypermetabolic nodule in the left lobe of the thyroid. Consider FNA  as this should be considered papillary thyroid carcinoma until proven  Otherwise.    7/6/18: Fine Needle Aspiration  Atypia of undetermined significance has a 5-15% risk of malignancy,   recommended repeat FNA.     7/31/18: Left lung nodule biopsy  Special stains with appropriate controls were performed.  The tumor cells   are strongly positive for P16, P40 and CK-5/6.  Considering the patient's history and strong P16 positivity, the tumor in the lung most likely represents a metastasis from patient's cervical squamous cell carcinoma    8/22/18-10/4/18: Cycle #1-3 Paclitaxel/Carboplatin/Bevacizumab.  10/26/18: CT C/A/P with disease progression  10/30/18: Cycle 1 Keytruda  12/7/18: Plan cycle 2 Keytruda    Past Medical History:   Diagnosis Date     Angina at rest (H)      Anxiety      Cervical cancer, FIGO stage IIIB (H)      Coronary atherosclerosis        Past Surgical History:   Procedure Laterality Date     AS ABLATION, ENDOMETRIAL, THERMAL, W/O HYSTEROSCOPIC  GUIDANCE       CERVICAL CANCER SCREENING RESULTS DOCUMENTED AND REVIEWED        SECTION       EXAM UNDER ANESTHESIA, INSERT LEON SLEEVE CERVIX N/A 2018    Procedure: EXAM UNDER ANESTHESIA, INSERT LEON SLEEVE CERVIX;  Insertion of Leon Sleeve Cervix with Ultrasound Guidance;  Surgeon: Jenifer Platt MD;  Location: [facility] OR     INSERT PORT VASCULAR ACCESS Right 2018    Procedure: INSERT PORT VASCULAR ACCESS;  Chest Port Placement, Single Lumen ;  Surgeon: Maxx Govea PA-C;  Location: [facility]     TUBAL LIGATION       US BREAST BIOPSY RT N/A     Lanced for breast abscess       Current Outpatient Prescriptions   Medication     ACETAMINOPHEN PO     BusPIRone HCl (BUSPAR PO)     CLONIDINE HCL PO     Docusate Sodium (COLACE PO)     GABAPENTIN PO     HydrOXYzine Pamoate (VISTARIL PO)     LAMOTRIGINE PO     LORazepam (ATIVAN) 1 MG tablet     MIRTAZAPINE PO     morphine 10 MG/5ML solution     MORPHINE SULFATE PO     Prenatal MV-Min-Fe Fum-FA-DHA (PRENATAL 1 PO)     Sulfamethoxazole-Trimethoprim (BACTRIM PO)     ondansetron (ZOFRAN) 8 MG tablet     prochlorperazine (COMPAZINE) 10 MG tablet     Current Facility-Administered Medications   Medication     heparin 100 UNIT/ML injection 5 mL       Allergies   Allergen Reactions     Kiwi Swelling     Tongue swelling         Family History   Problem Relation Age of Onset     Cancer No family hx of        Social History     Social History     Marital status: Single     Spouse name: N/A     Number of children: N/A     Years of education: N/A     Occupational History     Not on file.     Social History Main Topics     Smoking status: Former Smoker     Smokeless tobacco: Never Used     Alcohol use No     Drug use: No     Sexual activity: Not on file     Other Topics Concern     Not on file     Social History Narrative       ROS  General: Decreased appetite, but denies changes in weight, weakness,  night sweats, hot flashes, fever, chills  HEENT: Denies  headaches, hair loss, spots or floaters, diplopia, masses, head injury, tinnitus, hearing loss, epistaxis, congestion, problems with teeth or gums, dysphonia, or dysphagia  Pulmonary: Denies cough, sputum, hemoptysis, shortness of breath, dyspnea on exertion, wheezing, or allergies  Cardiovascular: Denies chest pain, fainting, palpitations, murmurs, activity intolerance, swelling in legs, or high blood pressure  Gastrointestinal: Denies nausea, vomiting, constipation, abdominal pain, bloating, heartburn, melena, hematochezia, or jaundice  Genitourinary:pelvic pain, but denies dysuria, urinary urgency or frequency, hematuria, cloudy or malodorous urine, incontinence, repeat urinary tract infections, flank pain, vaginal bleeding, vaginal discharge, or vaginal dryness  Musculoskeletal: back pain, but denies  myalgias, arthralgias, stiffness, muscle weakness or muscle cramps  Neurologic: Denies numbness or tingling, changes in memory, difficulty with walking, dizziness, seizures, or tremors  Psychiatric: + anxiety, mood changes, difficulty concentrating, and depression, but denies nervousness, suicidal thoughts  Endocrine: Denies polydipsia, polyuria, temperature intolerance, or history of thyroid disease      Physical Exam:    /69 (BP Location: Right arm, Patient Position: Sitting, Cuff Size: Adult Regular)  Pulse 74  Temp 97.8  F (36.6  C) (Oral)  Resp 14  SpO2 98%    Here with 2 guards from correction.   CONSTITUTIONAL: Alert non-toxic appearing female in no acute distress. . Cried appropriately when discussing family situation and prognosis.   PSYCHIATRIC: Flat affect. Normal speech and thought process linear.   Remainder of exam deferred         Recent Labs   Lab Test 12/07/18  1053 11/20/18  1212    138   POTASSIUM 4.3 4.6   CHLORIDE 105 105   CO2 23 24   ANIONGAP 9 10   * 106*   BUN 22 30   CR 0.95 0.80   CLAUDIO 8.5 8.8     Liver Function Studies -   Recent Labs   Lab Test 12/07/18  1053  "  PROTTOTAL 7.7   ALBUMIN 3.4   BILITOTAL 0.2   ALKPHOS 85   AST 22   ALT 27         Assessment/Plan:  1) Stage IIB squamous cell carcinoma of the cervix: Recurrent, progressive disease (in lung). I have reviewed labs.     -Cnt Keytruda. Would recommend 3-4 cycles and then reimage with    CT C/A/P  -Will send medical records to [facility] so that she can get her treatment closer to home  -Reviewed palliative nature of treatment    Claire Ji MD  Gynecologic Oncology  Pager [phone]     25 minutes face to face counseling                  Gynecologic Oncology Follow-Up Note    RE: Patty Beckett  MRN: [id number]  : 1975  Date of Visit: 2018    CC: Patty Beckett is a 43 year old female with progressive squamous cell carcinoma of the cervix who presents today for follow up regarding disease management.     HPI:     Presents today with two guards. Notes she has been \"resting a lot and feeling lazy\". Has been eating more lately and has gained about 15 lbs in the last 3 months. Tolerating Keytruda well without any significant side effects. Denies diarrhea, constipation, rashes, night sweats or hot flashes. Denies vaginal bleeding, nausea or vomiting. Continuing to have occasional pelvic pain, but only about half as intense as previously. Describes pain as \"shooting and sharp\" in nature. Mood is improved with lorazepam. Stable numbness and tingling in hands and feet. Going to be released on 12/10.     Oncology History:  Ms Beckett had heavy bleeding 10/2017     11/24/17: ECC and endometrium curettage; pathology consult by [place] done 1/3/18  A. ENDOCERVIX, CURETTAGE:   - Invasive squamous cell carcinoma, moderately differentiated,   keratinizing type     B. ENDOMETRIUM, CURETTAGE:   - Invasive squamous cell carcinoma, moderately differentiated,   keratinizing type     17: PET CT     17: U/S head/neck/thyroid       12/15/17: FNA left neck LN       18: CT ap IMPRESSION:   1. " Heterogeneously enhancing cervical mass extends superiorly to involve the lower uterine segment. This hypermetabolic lesion is better delineated on comparison PET CT. CT evaluation for parametrial invasion is limited, though no karen parametrial involvement is seen. No definite evidence of invasion to the bladder and rectum. If definitive staging is required, consider dedicated MRI.  2. Numerous enlarged centrally necrotic pelvic lymph nodes, as well as aortocaval and precaval nodes, which showed FDG activity on prior PET CT.  3. New indeterminate 4 cm left ovarian cyst. Dedicated pelvic ultrasound could be considered if indicated    1/16/18:  Day 1 weekly cisplatin and EBRT   2/20/18: last day of weekly cisplatin  2/21/18: zoie sleeve insertion      Radiation Oncology - Course: 1  Treatment Site Dose Modality From  To Elapsed Days Fx   Pelvis and PANs 4,550 cGy 06 X 1/16/2018 2/22/2018 37 26   Cervix 2,750 cGy Implant  2/26/18 3/7/2018 9 5     Dose per Fraction: 175 cGy EBRT, 550 cGy HDR  Total Dose: Equivalent dose D90 HRCTV 83.3 Gy    6/7/18: PET whole body   Largest in the left base  measures 11 mm and demonstrates increased metabolic activity with  maximum SUV of 2.29. Redemonstration of hypermetabolic nodule in the  left lobe of the thyroid, with maximum SUV of 6.35; on CT the lesion  measures 12 mm and is hypodense.  IMPRESSION:   In this patient with invasive squamous cell carcinoma of the  endocervix:  1. Significant decrease in size and metabolic activity of endocervical  lesion since 12/8/2017.  2. New bilateral lung nodules, consistent with metastatic disease.  3. Hypermetabolic nodule in the left lobe of the thyroid. Consider FNA  as this should be considered papillary thyroid carcinoma until proven  Otherwise.    7/6/18: Fine Needle Aspiration  Atypia of undetermined significance has a 5-15% risk of malignancy,   recommended repeat FNA.     7/31/18: Left lung nodule biopsy  Special stains with  appropriate controls were performed.  The tumor cells   are strongly positive for P16, P40 and CK-5/6.  Considering the patient's history and strong P16 positivity, the tumor in the lung most likely represents a metastasis from patient's cervical squamous cell carcinoma    18-10/4/18: Cycle #1-3 Paclitaxel/Carboplatin/Bevacizumab.  10/26/18: CT C/A/P with disease progression  10/30/18: Cycle 1 Keytruda  18: Cycle 2 Keytruda      Past Medical History:   Diagnosis Date     Angina at rest (H)      Anxiety      Cervical cancer, FIGO stage IIIB (H)      Coronary atherosclerosis        Past Surgical History:   Procedure Laterality Date     AS ABLATION, ENDOMETRIAL, THERMAL, W/O HYSTEROSCOPIC GUIDANCE       CERVICAL CANCER SCREENING RESULTS DOCUMENTED AND REVIEWED        SECTION       EXAM UNDER ANESTHESIA, INSERT LEON SLEEVE CERVIX N/A 2018    Procedure: EXAM UNDER ANESTHESIA, INSERT LEON SLEEVE CERVIX;  Insertion of Leon Sleeve Cervix with Ultrasound Guidance;  Surgeon: Jenifer Platt MD;  Location: [facility] OR     INSERT PORT VASCULAR ACCESS Right 2018    Procedure: INSERT PORT VASCULAR ACCESS;  Chest Port Placement, Single Lumen ;  Surgeon: Maxx Govea PA-C;  Location: [facility] OR     TUBAL LIGATION       US BREAST BIOPSY RT N/A     Lanced for breast abscess       Current Outpatient Prescriptions   Medication     ACETAMINOPHEN PO     BusPIRone HCl (BUSPAR PO)     CLONIDINE HCL PO     Docusate Sodium (COLACE PO)     GABAPENTIN PO     HydrOXYzine Pamoate (VISTARIL PO)     LAMOTRIGINE PO     LORazepam (ATIVAN) 1 MG tablet     MIRTAZAPINE PO     morphine 10 MG/5ML solution     MORPHINE SULFATE PO     oxyCODONE-acetaminophen (PERCOCET) 5-325 MG tablet     Prenatal MV-Min-Fe Fum-FA-DHA (PRENATAL 1 PO)     Sulfamethoxazole-Trimethoprim (BACTRIM PO)     ondansetron (ZOFRAN) 8 MG tablet     prochlorperazine (COMPAZINE) 10 MG tablet     Current Facility-Administered Medications   Medication      heparin 100 UNIT/ML injection 5 mL       Allergies   Allergen Reactions     Kiwi Swelling     Tongue swelling         Family History   Problem Relation Age of Onset     Cancer No family hx of        Social History     Social History     Marital status: Single     Spouse name: N/A     Number of children: N/A     Years of education: N/A     Occupational History     Not on file.     Social History Main Topics     Smoking status: Former Smoker     Smokeless tobacco: Never Used     Alcohol use No     Drug use: No     Sexual activity: Not on file     Other Topics Concern     Not on file     Social History Narrative       ROS  General: Improved appetite. Denies changes in weakness,  night sweats, hot flashes, fever, chills  HEENT: Denies headaches.  Pulmonary: Denies cough.  Cardiovascular: Denies chest pain, or palpitations.   Gastrointestinal: Denies nausea, vomiting, constipation, diarrhea.  Genitourinary: + pelvic pain, but denies dysuria, urinary urgency or frequency, hematuria, cloudy or malodorous urine, incontinence, vaginal bleeding  Musculoskeletal: back pain, but denies  myalgias, arthralgias  Neurologic: + numbness and tingling. Denies difficulty with walking, dizziness, seizures, or tremors  Psychiatric: + anxiety, mood changes, difficulty concentrating, and depression, but denies nervousness, suicidal thoughts  Endocrine: Denies polydipsia, polyuria, temperature intolerance, or history of thyroid disease      Physical Exam:    /69 (BP Location: Right arm, Patient Position: Sitting, Cuff Size: Adult Regular)  Pulse 74  Temp 97.8  F (36.6  C) (Oral)  Resp 14  SpO2 98%    Here with 2 guards from custodial.   CONSTITUTIONAL: Alert non-toxic appearing female in no acute distress.  PSYCHIATRIC: Flat affect. Normal speech and thought process linear.   Remainder of exam deferred    Labs:  Original Report Follows Addendum     TO ORIGINAL REPORT   Status: Signed Out   Date Ordered:8/15/2018   Date  Reported:8/15/2018 10:49   Signed Out By: Kee Fox M.D., PhD, [facility]     INTERPRETATION:   RESULT FOR IMMUNOHISTOCHEMICAL VENTANA CLONE  PD-L1 ASSAY   TUMOR PROPORTION SCORE (TPS):             80%   INTERPRETATION: HIGH PD-L1 EXPRESSION (TPS d50%)       10/26/18:   IMPRESSION:   In this patient with a history of squamous cell carcinoma of the  cervix:  1. In the region of the previously demonstrated hypermetabolism within  the cervix on the comparison PET/CT on 6/7/2018, there is a poorly  defined hypoenhancing focus. While this may represent posttreatment  change, cannot exclude residual viable tumor.  2. Multiple pulmonary nodules in both lungs, increased in size and  number and now demonstrating cavitary changes. Findings are concerning  for progression of metastatic pulmonary disease.  3. Stable size of a 1.4 cm necrotic right iliac lymph node.  4. Stable 1.5 cm left thyroid lobe nodule, previously shown to be  hypermetabolic on the comparison PET/CT. Given roughly 50% chance of  neoplasia based on PET positivity, recommend further evaluation with  FNA.     I have personally reviewed the examination and initial interpretation  and I agree with the findings.     JACKY CHERRY MD    TSH 0.83  Creatinine 0.95  ALT 27  AST 22  K 4.3    Assessment/Plan:  1) Stage IIB squamous cell carcinoma of the cervix, now s/p 2 cycles Keytruda. 3rd cycle planned for today.       ain, thank you for allowing me to participate in the care of your patient.      Sincerely,    Claire Ji MD

## 2023-05-13 ENCOUNTER — HOSPITAL ENCOUNTER (OUTPATIENT)
Dept: MAMMOGRAPHY | Age: 53
End: 2023-05-13
Payer: COMMERCIAL

## 2023-05-13 ENCOUNTER — HOSPITAL ENCOUNTER (OUTPATIENT)
Age: 53
Discharge: HOME | End: 2023-05-13
Payer: COMMERCIAL

## 2023-05-13 DIAGNOSIS — E66.9 OBESITY WITH BODY MASS INDEX 30 OR GREATER: ICD-10-CM

## 2023-05-13 DIAGNOSIS — E11.9 TYPE 2 DIABETES MELLITUS WITHOUT COMPLICATION, WITHOUT LONG-TERM CURRENT USE OF INSULIN (HCC): ICD-10-CM

## 2023-05-13 DIAGNOSIS — Z12.31 VISIT FOR SCREENING MAMMOGRAM: ICD-10-CM

## 2023-05-13 LAB
ABSOLUTE EOS #: 0.27 K/UL (ref 0–0.44)
ABSOLUTE IMMATURE GRANULOCYTE: 0.03 K/UL (ref 0–0.3)
ABSOLUTE LYMPH #: 1.85 K/UL (ref 1.1–3.7)
ABSOLUTE MONO #: 0.45 K/UL (ref 0.1–1.2)
ALBUMIN SERPL-MCNC: 4 G/DL (ref 3.5–5.2)
ALBUMIN/GLOBULIN RATIO: 1.4 (ref 1–2.5)
ALP SERPL-CCNC: 69 U/L (ref 35–104)
ALT SERPL-CCNC: 16 U/L (ref 5–33)
ANION GAP SERPL CALCULATED.3IONS-SCNC: 9 MMOL/L (ref 9–17)
AST SERPL-CCNC: 15 U/L
BASOPHILS # BLD: 1 % (ref 0–2)
BASOPHILS ABSOLUTE: 0.04 K/UL (ref 0–0.2)
BILIRUB SERPL-MCNC: 0.3 MG/DL (ref 0.3–1.2)
BUN SERPL-MCNC: 12 MG/DL (ref 6–20)
BUN/CREAT BLD: 19 (ref 9–20)
CALCIUM SERPL-MCNC: 9.1 MG/DL (ref 8.6–10.4)
CHLORIDE SERPL-SCNC: 106 MMOL/L (ref 98–107)
CHOLEST SERPL-MCNC: 157 MG/DL
CHOLESTEROL/HDL RATIO: 3
CO2 SERPL-SCNC: 26 MMOL/L (ref 20–31)
CREAT SERPL-MCNC: 0.64 MG/DL (ref 0.5–0.9)
CREATININE URINE: 116.9 MG/DL (ref 28–217)
EOSINOPHILS RELATIVE PERCENT: 4 % (ref 1–4)
GFR SERPL CREATININE-BSD FRML MDRD: >60 ML/MIN/1.73M2
GLUCOSE SERPL-MCNC: 139 MG/DL (ref 70–99)
HCT VFR BLD AUTO: 38.3 % (ref 36.3–47.1)
HDLC SERPL-MCNC: 52 MG/DL
HGB BLD-MCNC: 12.6 G/DL (ref 11.9–15.1)
IMMATURE GRANULOCYTES: 0 %
LDLC SERPL CALC-MCNC: 88 MG/DL (ref 0–130)
LYMPHOCYTES # BLD: 28 % (ref 24–43)
MCH RBC QN AUTO: 28.1 PG (ref 25.2–33.5)
MCHC RBC AUTO-ENTMCNC: 32.9 G/DL (ref 25.2–33.5)
MCV RBC AUTO: 85.5 FL (ref 82.6–102.9)
MICROALBUMIN/CREAT 24H UR: <12 MG/L
MICROALBUMIN/CREAT UR-RTO: NORMAL MCG/MG CREAT
MONOCYTES # BLD: 7 % (ref 3–12)
NRBC AUTOMATED: 0 PER 100 WBC
PDW BLD-RTO: 14 % (ref 11.8–14.4)
PLATELET # BLD AUTO: 188 K/UL (ref 138–453)
PMV BLD AUTO: 10.8 FL (ref 8.1–13.5)
POTASSIUM SERPL-SCNC: 5.1 MMOL/L (ref 3.7–5.3)
PROT SERPL-MCNC: 6.9 G/DL (ref 6.4–8.3)
RBC # BLD: 4.48 M/UL (ref 3.95–5.11)
SEG NEUTROPHILS: 60 % (ref 36–65)
SEGMENTED NEUTROPHILS ABSOLUTE COUNT: 4.09 K/UL (ref 1.5–8.1)
SODIUM SERPL-SCNC: 141 MMOL/L (ref 135–144)
TRIGL SERPL-MCNC: 85 MG/DL
WBC # BLD AUTO: 6.7 K/UL (ref 3.5–11.3)

## 2023-05-13 PROCEDURE — 82043 UR ALBUMIN QUANTITATIVE: CPT

## 2023-05-13 PROCEDURE — 77063 BREAST TOMOSYNTHESIS BI: CPT

## 2023-05-13 PROCEDURE — 82570 ASSAY OF URINE CREATININE: CPT

## 2023-05-13 PROCEDURE — 36415 COLL VENOUS BLD VENIPUNCTURE: CPT

## 2023-05-13 PROCEDURE — 80053 COMPREHEN METABOLIC PANEL: CPT

## 2023-05-13 PROCEDURE — 85025 COMPLETE CBC W/AUTO DIFF WBC: CPT

## 2023-05-13 PROCEDURE — 83036 HEMOGLOBIN GLYCOSYLATED A1C: CPT

## 2023-05-13 PROCEDURE — 80061 LIPID PANEL: CPT

## 2023-05-14 LAB
EST. AVERAGE GLUCOSE BLD GHB EST-MCNC: 137 MG/DL
HBA1C MFR BLD: 6.4 % (ref 4–6)

## 2023-05-16 SDOH — ECONOMIC STABILITY: FOOD INSECURITY: WITHIN THE PAST 12 MONTHS, THE FOOD YOU BOUGHT JUST DIDN'T LAST AND YOU DIDN'T HAVE MONEY TO GET MORE.: NEVER TRUE

## 2023-05-16 SDOH — ECONOMIC STABILITY: HOUSING INSECURITY
IN THE LAST 12 MONTHS, WAS THERE A TIME WHEN YOU DID NOT HAVE A STEADY PLACE TO SLEEP OR SLEPT IN A SHELTER (INCLUDING NOW)?: NO

## 2023-05-16 SDOH — ECONOMIC STABILITY: FOOD INSECURITY: WITHIN THE PAST 12 MONTHS, YOU WORRIED THAT YOUR FOOD WOULD RUN OUT BEFORE YOU GOT MONEY TO BUY MORE.: NEVER TRUE

## 2023-05-16 SDOH — ECONOMIC STABILITY: INCOME INSECURITY: HOW HARD IS IT FOR YOU TO PAY FOR THE VERY BASICS LIKE FOOD, HOUSING, MEDICAL CARE, AND HEATING?: NOT HARD AT ALL

## 2023-05-16 SDOH — ECONOMIC STABILITY: TRANSPORTATION INSECURITY
IN THE PAST 12 MONTHS, HAS LACK OF TRANSPORTATION KEPT YOU FROM MEETINGS, WORK, OR FROM GETTING THINGS NEEDED FOR DAILY LIVING?: NO

## 2023-05-19 ENCOUNTER — OFFICE VISIT (OUTPATIENT)
Dept: FAMILY MEDICINE CLINIC | Age: 53
End: 2023-05-19

## 2023-05-19 ENCOUNTER — TELEPHONE (OUTPATIENT)
Dept: SURGERY | Age: 53
End: 2023-05-19

## 2023-05-19 VITALS
HEIGHT: 62 IN | DIASTOLIC BLOOD PRESSURE: 62 MMHG | BODY MASS INDEX: 33.53 KG/M2 | SYSTOLIC BLOOD PRESSURE: 124 MMHG | WEIGHT: 182.2 LBS | HEART RATE: 72 BPM

## 2023-05-19 DIAGNOSIS — Z12.11 ENCOUNTER FOR SCREENING COLONOSCOPY: Primary | ICD-10-CM

## 2023-05-19 DIAGNOSIS — J45.20 MILD INTERMITTENT ASTHMA WITHOUT COMPLICATION: ICD-10-CM

## 2023-05-19 DIAGNOSIS — E66.9 OBESITY WITH BODY MASS INDEX 30 OR GREATER: ICD-10-CM

## 2023-05-19 DIAGNOSIS — E11.9 TYPE 2 DIABETES MELLITUS WITHOUT COMPLICATION, WITHOUT LONG-TERM CURRENT USE OF INSULIN (HCC): ICD-10-CM

## 2023-05-19 ASSESSMENT — PATIENT HEALTH QUESTIONNAIRE - PHQ9
2. FEELING DOWN, DEPRESSED OR HOPELESS: 0
SUM OF ALL RESPONSES TO PHQ QUESTIONS 1-9: 0
1. LITTLE INTEREST OR PLEASURE IN DOING THINGS: 0
SUM OF ALL RESPONSES TO PHQ QUESTIONS 1-9: 0
SUM OF ALL RESPONSES TO PHQ9 QUESTIONS 1 & 2: 0

## 2023-05-19 ASSESSMENT — ENCOUNTER SYMPTOMS
DIARRHEA: 1
RESPIRATORY NEGATIVE: 1
ALLERGIC/IMMUNOLOGIC NEGATIVE: 1
EYES NEGATIVE: 1

## 2023-05-19 NOTE — PROGRESS NOTES
Subjective:      Patient ID: Melanie Grimes is a 46 y.o. female. Diabetes  Pertinent negatives for diabetes include no polydipsia, no polyphagia and no polyuria. Routine follow up on chronic medical conditions, refills, and review of updated labs. She had gestational diabetes with both pregnancies requiring insulin. She feels well. No polydipsia or polyphagia. Wt. Fairly stable over the interval.   She has no acute concerns or symptoms on review. Father had diabetes as an adult. She is not checking bs at home. Taking glucophage 750mg daily. Compliant with medications. Some intermittent diarrhea, tolerable, not much at present. .  No asthma concerns. Has not needed rescue inhaler over 6 months. Only using symbicort 2  times/week. She has decreased the dose to minimal effective range. Overall stable. She is finding the minimum effective dose. Has done this long term. Not much use at present. Past Medical History:   Diagnosis Date    Asthma     Diabetes mellitus type 2, uncomplicated (HCC)     Elbow fracture 2017    Left elbow    Gestational diabetes     Obesity      Past Surgical History:   Procedure Laterality Date     SECTION      x2    TONSILLECTOMY       Current Outpatient Medications   Medication Sig Dispense Refill    metFORMIN (GLUCOPHAGE-XR) 750 MG extended release tablet TAKE ONE TABLET BY MOUTH DAILY 90 tablet 1    budesonide-formoterol (SYMBICORT) 80-4.5 MCG/ACT AERO INHALE TWO PUFFS BY MOUTH DAILY 10.2 g 5    albuterol sulfate  (90 Base) MCG/ACT inhaler Inhale 2 puffs into the lungs every 6 hours as needed for Wheezing 1 each 1     No current facility-administered medications for this visit. Allergies   Allergen Reactions    Bactrim [Sulfamethoxazole-Trimethoprim] Diarrhea         Review of Systems   Constitutional: Negative. HENT: Negative. Eyes: Negative. Respiratory: Negative. Cardiovascular: Negative.     Gastrointestinal:  Positive for

## 2023-05-19 NOTE — PATIENT INSTRUCTIONS
Hospital Outpatient Visit on 05/13/2023   Component Date Value Ref Range Status    Hemoglobin A1C 05/13/2023 6.4 (H)  4.0 - 6.0 % Final    Estimated Avg Glucose 05/13/2023 137  mg/dL Final    Comment: The ADA and AACC recommend providing the estimated average glucose result to permit better   patient understanding of their HBA1c result.       WBC 05/13/2023 6.7  3.5 - 11.3 k/uL Final    RBC 05/13/2023 4.48  3.95 - 5.11 m/uL Final    Hemoglobin 05/13/2023 12.6  11.9 - 15.1 g/dL Final    Hematocrit 05/13/2023 38.3  36.3 - 47.1 % Final    MCV 05/13/2023 85.5  82.6 - 102.9 fL Final    MCH 05/13/2023 28.1  25.2 - 33.5 pg Final    MCHC 05/13/2023 32.9  25.2 - 33.5 g/dL Final    RDW 05/13/2023 14.0  11.8 - 14.4 % Final    Platelets 41/00/2800 188  138 - 453 k/uL Final    MPV 05/13/2023 10.8  8.1 - 13.5 fL Final    NRBC Automated 05/13/2023 0.0  0.0 per 100 WBC Final    Seg Neutrophils 05/13/2023 60  36 - 65 % Final    Lymphocytes 05/13/2023 28  24 - 43 % Final    Monocytes 05/13/2023 7  3 - 12 % Final    Eosinophils % 05/13/2023 4  1 - 4 % Final    Basophils 05/13/2023 1  0 - 2 % Final    Immature Granulocytes 05/13/2023 0  0 % Final    Segs Absolute 05/13/2023 4.09  1.50 - 8.10 k/uL Final    Absolute Lymph # 05/13/2023 1.85  1.10 - 3.70 k/uL Final    Absolute Mono # 05/13/2023 0.45  0.10 - 1.20 k/uL Final    Absolute Eos # 05/13/2023 0.27  0.00 - 0.44 k/uL Final    Basophils Absolute 05/13/2023 0.04  0.00 - 0.20 k/uL Final    Absolute Immature Granulocyte 05/13/2023 0.03  0.00 - 0.30 k/uL Final    Cholesterol, Fasting 05/13/2023 157  <200 mg/dL Final    Comment:    Cholesterol Guidelines:      <200  Desirable   200-240  Borderline      >240  Undesirable         HDL 05/13/2023 52  >40 mg/dL Final    Comment:    HDL Guidelines:    <40     Undesirable   40-59    Borderline    >59     Desirable         LDL Cholesterol 05/13/2023 88  0 - 130 mg/dL Final    Comment:    LDL Guidelines:     <100    Desirable   100-129   [facility] Hospitalist Progress Note  Timothy Maciel MD  Answering service: [phone] from in house phone        Date of Service:  3/9/2023  NAME:  Elvira Lee  :    MRN:  [id number]      Admission Summary:   Elvira Lee is a 76 y.o. male past medical history of atrial fibrillation, long-term anticoagulation therapy on Eliquis, lower extremity edema, cellulitis, hypertension, type 2 diabetes mellitus, scrotal hernia, and obesity presented to the emergency department via EMS from home with chief complaints of unhealed wounds. Patient has multiple wounds of bilateral lower extremities with history of chronic venous stasis. Per reports patient's bilateral lower extremity wounds have been weeping with pus over the last 7 days with increased swelling edema both legs to severe, constant, without specific alleviating factors. There is no reports of fever. On arrival emergency department, initial reported vital signs temperature 98.4 °F, /105, heart rate 113, respiratory rate 20, O2 saturations 99% on room air. Left and right tib-fib fib x-ray showed bilateral lower extremity edema, minimal periosteal reaction along tibia and fibular diaphysis questionable for stress reaction or infection. 12 lead EKG showed atrial fibrillation with rapid ventricular response at 107 bpm.  ED administered diltiazem 20 mg IV x1 dose, Tylenol 650 mg p.o. x1, vancomycin 2500 mg IV x1 dose. Patient is now seen for admission to the hospitalist service. Patient complains of severe pain associated with leg swelling and edema. Despite the same, he notes he is ambulatory without assistance. In addition, he has scrotal open wounds which she notes are chronic and had hernia associated with scrotal swelling for several years.           Interval history / Subjective:   Patient seen and examined this afternoon, sitting in chair, on room air. No complaints. Discussed current care plans: Continue empiric dual IV antibiotics and IV diuretics. Assessment & Plan:     Atrial fibrillation with RVR   -HR control achieved with IV diltiazem. Continue with p.o. diltiazem, metoprolol succinate. Anticoagulated with apixaban.  -Echo  EF 45-50%   -Cardiology consulted and following. Acute on chronic systolic/diastolic congestive heart failure NYHA class IV. EF 45 to 50%  -Placed on strict I's and O's and daily weights  - Lasix 40 mg IV daily, continue as long renal funciton tolerate   -GDMT per cardiology. Chronic lower extremity venous stasis, chronic nonhealing ulcer complicated by cellulitis of right lower extremity   -Continue IV antibiotics, IV diuretics.  -Continue local wound care     Type 2 diabetes mellitus-A1c 3.5  -Order Humalog insulin correctional coverage, scheduled blood glucose checks      Essential hypertension  -Continue metoprolol, diltiazem     Class III obesity  -BMI 37.31 kg/M2  -Would encourage weight loss, reduced calorie diet, lifestyle modifications    Suprapubic catheter     Massive right inguinal hernia. Patient denies any complaints, states he has lived with it for so long it does not bother him. Code status: full   Prophylaxis: eliquis   Care Plan discussed with: pt, rn   Anticipated Disposition: tbd , pt, ot eval, pt was living with a roommate prior, may return back to [facility] .  Concerning if he can do his dressing change daily at home      Hospital Problems  Never Reviewed            Codes Class Noted POA    Atrial fibrillation with RVR ([facility]) ICD-10-CM: I48.91  ICD-9-CM: 427.31  3/3/2023 Unknown        Edema of lower extremity ICD-10-CM: R60.0  ICD-9-CM: 782.3  3/3/2023 Unknown        Cellulitis of right lower extremity ICD-10-CM: L03.115  ICD-9-CM: 682.6  3/3/2023 Unknown        Cellulitis of left lower extremity ICD-10-CM: L03.116  ICD-9-CM: 682.6  3/3/2023 Unknown         Review of Systems: A comprehensive review of systems was negative except for that written in the HPI. Vital Signs:    Last 24hrs VS reviewed since prior progress note. Most recent are:  Visit Vitals  /68 (BP 1 Location: Right upper arm, BP Patient Position: At rest)   Pulse 90   Temp 97.8 °F (36.6 °C)   Resp 16   Ht 5' 10\" (1.778 m)   Wt 135.5 kg (298 lb 11.6 oz)   SpO2 96%   BMI 42.86 kg/m²         Intake/Output Summary (Last 24 hours) at 3/9/2023 1822  Last data filed at 3/9/2023 1600  Gross per 24 hour   Intake --   Output 400 ml   Net -400 ml          Physical Examination:     I had a face to face encounter with this patient and independently examined them on 3/9/2023 as outlined below:          General : Patient seen sitting in a chair by the bedside, alert and oriented. HEENT: PEERL, EOMI, moist mucus membrane, TM clear  Neck: supple, no JVD, no meningeal signs  Chest: Clear to auscultation bilaterally   CVS: S1 S2 heard, Capillary refill less than 2 seconds  Abd: soft, large inguinal hernia, suprapuic catheter, mild red groin folded shon   Ext:Extensive, severe diffuse erythema, tenderness, swelling of both legs with large open stage II wound on the posterior aspect distal right leg  Stage II large open wound on scrotum  Edema improved     Neuro/Psych:no focal weakness. Skin: warm            Data Review:    Review and/or order of clinical lab test    I have independently reviewed and interpreted patient's lab and all other diagnostic data    Notes reviewed from all clinical/nonclinical/nursing services involved in patient's clinical care. Care coordination discussions were held with appropriate clinical/nonclinical/ nursing providers based on care coordination needs.      Labs:     Recent Labs     03/09/23  0410 03/08/23  0340   WBC 7.9 8.2   HGB 12.4 12.3   HCT 39.1 38.2    211       Recent Labs     03/09/23  0410 03/08/23  0340 03/07/23  0148    139 136   K 4.2 3.7 3.8    103 102   CO2 29 30 29   BUN 14 15 14   CREA 0.79 0.72 0.86   GLU 94 92 97   CA 8.2* 8.6 8.2*       Recent Labs     03/07/23  0148   ALT 17   AP 80   TBILI 0.4   TP 7.0   ALB 2.4*   GLOB 4.6*       No results for input(s): INR, PTP, APTT, INREXT, INREXT in the last 72 hours. No results for input(s): FE, TIBC, PSAT, FERR in the last 72 hours. No results found for: FOL, RBCF   No results for input(s): PH, PCO2, PO2 in the last 72 hours. No results for input(s): CPK, CKNDX, TROIQ in the last 72 hours.     No lab exists for component: CPKMB  No results found for: CHOL, CHOLX, CHLST, CHOLV, HDL, HDLP, LDL, LDLC, DLDLP, TGLX, TRIGL, TRIGP, CHHD, CHHDX  Lab Results   Component Value Date/Time    Glucose (POC) 99 03/09/2023 05:13 PM    Glucose (POC) 103 03/09/2023 11:17 AM    Glucose (POC) 94 03/09/2023 06:58 AM    Glucose (POC) 107 03/08/2023 09:38 PM    Glucose (POC) 125 (H) 03/08/2023 04:49 PM     No results found for: COLOR, APPRN, SPGRU, REFSG, HUBER, PROTU, GLUCU, KETU, BILU, UROU, SHARON, LEUKU, GLUKE, EPSU, BACTU, WBCU, RBCU, CASTS, UCRY      Medications Reviewed:     Current Facility-Administered Medications   Medication Dose Route Frequency    vancomycin (VANCOCIN) 1,250 mg in 0.9% sodium chloride 250 mL ([id number])  1,250 mg IntraVENous Q12H    traMADoL (ULTRAM) tablet 50 mg  50 mg Oral Q6H PRN    dilTIAZem ER (CARDIZEM CD) capsule 180 mg  180 mg Oral DAILY    apixaban (ELIQUIS) tablet 5 mg  5 mg Oral Q12H    ergocalciferol capsule 50,000 Units  50,000 Units Oral every Saturday    metoprolol succinate (TOPROL-XL) XL tablet 100 mg  100 mg Oral DAILY    cyanocobalamin (VITAMIN B12) tablet 1,000 mcg  1,000 mcg Oral DAILY    glucose chewable tablet 16 g  4 Tablet Oral PRN    glucagon (GLUCAGEN) injection 1 mg  1 mg IntraMUSCular PRN    dextrose 10 % infusion 0-250 mL  0-250 mL IntraVENous PRN    insulin lispro (HUMALOG) injection   SubCUTAneous AC&HS    sodium chloride (NS) flush 5-40 mL  5-40 mL IntraVENous Q8H    sodium chloride (NS) flush 5-40 mL  5-40 mL IntraVENous PRN    acetaminophen (TYLENOL) tablet 650 mg  650 mg Oral Q6H PRN    Or    acetaminophen (TYLENOL) suppository 650 mg  650 mg Rectal Q6H PRN    polyethylene glycol (MIRALAX) packet 17 g  17 g Oral DAILY PRN    ondansetron (ZOFRAN ODT) tablet 4 mg  4 mg Oral Q8H PRN    Or    ondansetron (ZOFRAN) injection 4 mg  4 mg IntraVENous Q6H PRN    morphine injection 4 mg  4 mg IntraVENous Q4H PRN    vancomycin - pharmacy to dose   Other Rx Dosing/Monitoring    cefepime (MAXIPIME) 2 g in 0.9% sodium chloride (MBP/ADV) 100 mL MBP  2 g IntraVENous Q12H    furosemide (LASIX) injection 40 mg  40 mg IntraVENous DAILY    miconazole (MICOTIN) 2 % powder   Topical BID     ______________________________________________________________________  EXPECTED LENGTH OF STAY: 3d 9h  ACTUAL LENGTH OF STAY:          6                 Alysia Thomson MD

## 2023-09-26 RX ORDER — METFORMIN HYDROCHLORIDE 750 MG/1
TABLET, EXTENDED RELEASE ORAL
Qty: 90 TABLET | Refills: 1 | Status: SHIPPED | OUTPATIENT
Start: 2023-09-26

## 2024-03-26 RX ORDER — METFORMIN HYDROCHLORIDE 750 MG/1
TABLET, EXTENDED RELEASE ORAL
Qty: 90 TABLET | Refills: 1 | OUTPATIENT
Start: 2024-03-26

## 2024-03-29 RX ORDER — ALBUTEROL SULFATE 90 UG/1
2 AEROSOL, METERED RESPIRATORY (INHALATION) EVERY 6 HOURS PRN
Qty: 1 EACH | Refills: 1 | Status: SHIPPED | OUTPATIENT
Start: 2024-03-29

## 2024-03-29 RX ORDER — METFORMIN HYDROCHLORIDE 750 MG/1
750 TABLET, EXTENDED RELEASE ORAL DAILY
Qty: 90 TABLET | Refills: 1 | Status: SHIPPED | OUTPATIENT
Start: 2024-03-29

## 2024-03-29 NOTE — TELEPHONE ENCOUNTER
Kori MEYERS called requesting a refill of the below medication which has been pended for you:     Requested Prescriptions     Pending Prescriptions Disp Refills    albuterol sulfate HFA (PROVENTIL;VENTOLIN;PROAIR) 108 (90 Base) MCG/ACT inhaler 1 each 1     Sig: Inhale 2 puffs into the lungs every 6 hours as needed for Wheezing    metFORMIN (GLUCOPHAGE-XR) 750 MG extended release tablet 90 tablet 1     Sig: Take 1 tablet by mouth daily       Last Appointment Date: 5/19/2023  Next Appointment Date: 5/17/2024    Allergies   Allergen Reactions    Bactrim [Sulfamethoxazole-Trimethoprim] Diarrhea

## 2024-05-14 ASSESSMENT — PATIENT HEALTH QUESTIONNAIRE - PHQ9
SUM OF ALL RESPONSES TO PHQ9 QUESTIONS 1 & 2: 0
SUM OF ALL RESPONSES TO PHQ QUESTIONS 1-9: 0
1. LITTLE INTEREST OR PLEASURE IN DOING THINGS: NOT AT ALL
2. FEELING DOWN, DEPRESSED OR HOPELESS: NOT AT ALL
SUM OF ALL RESPONSES TO PHQ QUESTIONS 1-9: 0
SUM OF ALL RESPONSES TO PHQ9 QUESTIONS 1 & 2: 0
SUM OF ALL RESPONSES TO PHQ QUESTIONS 1-9: 0
1. LITTLE INTEREST OR PLEASURE IN DOING THINGS: NOT AT ALL
SUM OF ALL RESPONSES TO PHQ QUESTIONS 1-9: 0

## 2024-05-17 ENCOUNTER — OFFICE VISIT (OUTPATIENT)
Dept: FAMILY MEDICINE CLINIC | Age: 54
End: 2024-05-17
Payer: COMMERCIAL

## 2024-05-17 ENCOUNTER — HOSPITAL ENCOUNTER (OUTPATIENT)
Age: 54
Discharge: HOME | End: 2024-05-17
Payer: COMMERCIAL

## 2024-05-17 VITALS
SYSTOLIC BLOOD PRESSURE: 124 MMHG | HEIGHT: 62 IN | HEART RATE: 78 BPM | OXYGEN SATURATION: 98 % | BODY MASS INDEX: 33.34 KG/M2 | DIASTOLIC BLOOD PRESSURE: 70 MMHG | WEIGHT: 181.2 LBS

## 2024-05-17 DIAGNOSIS — E66.9 OBESITY WITH BODY MASS INDEX 30 OR GREATER: ICD-10-CM

## 2024-05-17 DIAGNOSIS — E11.9 TYPE 2 DIABETES MELLITUS WITHOUT COMPLICATION, WITHOUT LONG-TERM CURRENT USE OF INSULIN (HCC): ICD-10-CM

## 2024-05-17 DIAGNOSIS — E11.9 TYPE 2 DIABETES MELLITUS WITHOUT COMPLICATION, WITHOUT LONG-TERM CURRENT USE OF INSULIN (HCC): Primary | ICD-10-CM

## 2024-05-17 DIAGNOSIS — Z12.11 ENCOUNTER FOR SCREENING COLONOSCOPY: ICD-10-CM

## 2024-05-17 DIAGNOSIS — J45.20 MILD INTERMITTENT ASTHMA WITHOUT COMPLICATION: ICD-10-CM

## 2024-05-17 LAB
ALBUMIN SERPL-MCNC: 4.4 G/DL (ref 3.5–5.2)
ALBUMIN/GLOB SERPL: 1.4 {RATIO} (ref 1–2.5)
ALP SERPL-CCNC: 75 U/L (ref 35–104)
ALT SERPL-CCNC: 25 U/L (ref 5–33)
ANION GAP SERPL CALCULATED.3IONS-SCNC: 10 MMOL/L (ref 9–17)
AST SERPL-CCNC: 25 U/L
BILIRUB SERPL-MCNC: 0.3 MG/DL (ref 0.3–1.2)
BUN SERPL-MCNC: 13 MG/DL (ref 6–20)
BUN/CREAT SERPL: 19 (ref 9–20)
CALCIUM SERPL-MCNC: 9.3 MG/DL (ref 8.6–10.4)
CHLORIDE SERPL-SCNC: 107 MMOL/L (ref 98–107)
CHOLEST SERPL-MCNC: 174 MG/DL (ref 0–199)
CHOLESTEROL/HDL RATIO: 3
CO2 SERPL-SCNC: 26 MMOL/L (ref 20–31)
CREAT SERPL-MCNC: 0.7 MG/DL (ref 0.5–0.9)
EST. AVERAGE GLUCOSE BLD GHB EST-MCNC: 146 MG/DL
GFR, ESTIMATED: >90 ML/MIN/1.73M2
GLUCOSE SERPL-MCNC: 143 MG/DL (ref 70–99)
HBA1C MFR BLD: 6.7 % (ref 4–6)
HDLC SERPL-MCNC: 54 MG/DL
LDLC SERPL CALC-MCNC: 98 MG/DL (ref 0–100)
POTASSIUM SERPL-SCNC: 4.4 MMOL/L (ref 3.7–5.3)
PROT SERPL-MCNC: 7.6 G/DL (ref 6.4–8.3)
SODIUM SERPL-SCNC: 143 MMOL/L (ref 135–144)
TRIGL SERPL-MCNC: 107 MG/DL
VLDLC SERPL CALC-MCNC: 21 MG/DL

## 2024-05-17 PROCEDURE — 99214 OFFICE O/P EST MOD 30 MIN: CPT | Performed by: FAMILY MEDICINE

## 2024-05-17 PROCEDURE — 80061 LIPID PANEL: CPT

## 2024-05-17 PROCEDURE — 36415 COLL VENOUS BLD VENIPUNCTURE: CPT

## 2024-05-17 PROCEDURE — 83036 HEMOGLOBIN GLYCOSYLATED A1C: CPT

## 2024-05-17 PROCEDURE — 80053 COMPREHEN METABOLIC PANEL: CPT

## 2024-05-17 SDOH — ECONOMIC STABILITY: FOOD INSECURITY: WITHIN THE PAST 12 MONTHS, YOU WORRIED THAT YOUR FOOD WOULD RUN OUT BEFORE YOU GOT MONEY TO BUY MORE.: NEVER TRUE

## 2024-05-17 SDOH — ECONOMIC STABILITY: FOOD INSECURITY: WITHIN THE PAST 12 MONTHS, THE FOOD YOU BOUGHT JUST DIDN'T LAST AND YOU DIDN'T HAVE MONEY TO GET MORE.: NEVER TRUE

## 2024-05-17 SDOH — ECONOMIC STABILITY: INCOME INSECURITY: HOW HARD IS IT FOR YOU TO PAY FOR THE VERY BASICS LIKE FOOD, HOUSING, MEDICAL CARE, AND HEATING?: NOT HARD AT ALL

## 2024-05-17 ASSESSMENT — PATIENT HEALTH QUESTIONNAIRE - PHQ9
SUM OF ALL RESPONSES TO PHQ QUESTIONS 1-9: 0
SUM OF ALL RESPONSES TO PHQ QUESTIONS 1-9: 0
SUM OF ALL RESPONSES TO PHQ9 QUESTIONS 1 & 2: 0
1. LITTLE INTEREST OR PLEASURE IN DOING THINGS: NOT AT ALL
2. FEELING DOWN, DEPRESSED OR HOPELESS: NOT AT ALL
SUM OF ALL RESPONSES TO PHQ QUESTIONS 1-9: 0
SUM OF ALL RESPONSES TO PHQ QUESTIONS 1-9: 0

## 2024-05-17 ASSESSMENT — ENCOUNTER SYMPTOMS
RESPIRATORY NEGATIVE: 1
EYES NEGATIVE: 1
DIARRHEA: 1
ALLERGIC/IMMUNOLOGIC NEGATIVE: 1

## 2024-05-17 NOTE — PROGRESS NOTES
Subjective:      Patient ID: Kori Darden is a 53 y.o. female.    Diabetes  Pertinent negatives for diabetes include no polydipsia, no polyphagia and no polyuria.   Leg Pain          Routine follow up on chronic medical conditions, refills, and review of updated labs.     She had gestational diabetes with both pregnancies requiring insulin.  She feels well.  No polydipsia or polyphagia.  Wt. Fairly stable over the interval.   She has no acute concerns or symptoms on review.  Father had diabetes as an adult.  She is not checking bs at home.  Taking glucophage 750mg daily.  Compliant with medications.  Some intermittent diarrhea, tolerable, not much at present. .  No asthma concerns.  Has not needed rescue inhaler over 6 months.  Only using symbicort 2  times/week.  She has decreased the dose to minimal effective range.  Overall stable.  She is finding the minimum effective dose.  Has done this long term.  Not much use at present.     Past Medical History:   Diagnosis Date    Asthma     Diabetes mellitus type 2, uncomplicated (HCC)     Elbow fracture 2017    Left elbow    Gestational diabetes     Obesity      Past Surgical History:   Procedure Laterality Date     SECTION      x2    TONSILLECTOMY       Current Outpatient Medications   Medication Sig Dispense Refill    albuterol sulfate HFA (PROVENTIL;VENTOLIN;PROAIR) 108 (90 Base) MCG/ACT inhaler Inhale 2 puffs into the lungs every 6 hours as needed for Wheezing 1 each 1    metFORMIN (GLUCOPHAGE-XR) 750 MG extended release tablet Take 1 tablet by mouth daily 90 tablet 1    budesonide-formoterol (SYMBICORT) 80-4.5 MCG/ACT AERO INHALE TWO PUFFS BY MOUTH DAILY 10.2 g 5     No current facility-administered medications for this visit.     Allergies   Allergen Reactions    Bactrim [Sulfamethoxazole-Trimethoprim] Diarrhea         Review of Systems   Constitutional: Negative.    HENT: Negative.     Eyes: Negative.    Respiratory: Negative.     Cardiovascular:

## 2024-06-14 ENCOUNTER — HOSPITAL ENCOUNTER (OUTPATIENT)
Dept: MAMMOGRAPHY | Age: 54
Discharge: HOME | End: 2024-06-14
Payer: COMMERCIAL

## 2024-06-14 VITALS — BODY MASS INDEX: 34.04 KG/M2 | WEIGHT: 185 LBS | HEIGHT: 62 IN

## 2024-06-14 DIAGNOSIS — Z12.31 VISIT FOR SCREENING MAMMOGRAM: ICD-10-CM

## 2024-06-14 PROCEDURE — 77063 BREAST TOMOSYNTHESIS BI: CPT

## 2024-09-16 RX ORDER — BUDESONIDE AND FORMOTEROL FUMARATE DIHYDRATE 80; 4.5 UG/1; UG/1
AEROSOL RESPIRATORY (INHALATION)
Qty: 10.2 G | Refills: 5 | Status: SHIPPED | OUTPATIENT
Start: 2024-09-16

## 2024-09-30 RX ORDER — METFORMIN HYDROCHLORIDE 750 MG/1
750 TABLET, EXTENDED RELEASE ORAL DAILY
Qty: 90 TABLET | Refills: 1 | Status: SHIPPED | OUTPATIENT
Start: 2024-09-30

## 2024-09-30 NOTE — TELEPHONE ENCOUNTER
Kori MEYERS called requesting a refill of the below medication which has been pended for you:     Requested Prescriptions     Pending Prescriptions Disp Refills    metFORMIN (GLUCOPHAGE-XR) 750 MG extended release tablet [Pharmacy Med Name: metFORMIN HCL  MG TABLET] 90 tablet 1     Sig: TAKE 1 TABLET BY MOUTH DAILY       Last Appointment Date: 5/17/2024  Next Appointment Date: 11/18/2024    Allergies   Allergen Reactions    Bactrim [Sulfamethoxazole-Trimethoprim] Diarrhea

## 2025-02-05 ENCOUNTER — TELEPHONE (OUTPATIENT)
Dept: FAMILY MEDICINE CLINIC | Age: 55
End: 2025-02-05

## 2025-04-08 RX ORDER — METFORMIN HYDROCHLORIDE 750 MG/1
750 TABLET, EXTENDED RELEASE ORAL DAILY
Qty: 90 TABLET | Refills: 1 | Status: SHIPPED | OUTPATIENT
Start: 2025-04-08

## 2025-04-08 NOTE — TELEPHONE ENCOUNTER
Kori MEYERS called requesting a refill of the below medication which has been pended for you:     Requested Prescriptions     Pending Prescriptions Disp Refills    metFORMIN (GLUCOPHAGE-XR) 750 MG extended release tablet 90 tablet 1     Sig: Take 1 tablet by mouth daily       Last Appointment Date: 5/17/2024  Next Appointment Date: 4/25/2025    Allergies   Allergen Reactions    Bactrim [Sulfamethoxazole-Trimethoprim] Diarrhea

## 2025-04-24 SDOH — ECONOMIC STABILITY: INCOME INSECURITY: IN THE LAST 12 MONTHS, WAS THERE A TIME WHEN YOU WERE NOT ABLE TO PAY THE MORTGAGE OR RENT ON TIME?: NO

## 2025-04-24 SDOH — ECONOMIC STABILITY: FOOD INSECURITY: WITHIN THE PAST 12 MONTHS, THE FOOD YOU BOUGHT JUST DIDN'T LAST AND YOU DIDN'T HAVE MONEY TO GET MORE.: NEVER TRUE

## 2025-04-24 SDOH — ECONOMIC STABILITY: FOOD INSECURITY: WITHIN THE PAST 12 MONTHS, YOU WORRIED THAT YOUR FOOD WOULD RUN OUT BEFORE YOU GOT MONEY TO BUY MORE.: NEVER TRUE

## 2025-04-24 SDOH — ECONOMIC STABILITY: TRANSPORTATION INSECURITY
IN THE PAST 12 MONTHS, HAS THE LACK OF TRANSPORTATION KEPT YOU FROM MEDICAL APPOINTMENTS OR FROM GETTING MEDICATIONS?: NO

## 2025-04-24 ASSESSMENT — PATIENT HEALTH QUESTIONNAIRE - PHQ9
SUM OF ALL RESPONSES TO PHQ QUESTIONS 1-9: 0
SUM OF ALL RESPONSES TO PHQ QUESTIONS 1-9: 0
2. FEELING DOWN, DEPRESSED OR HOPELESS: NOT AT ALL
SUM OF ALL RESPONSES TO PHQ QUESTIONS 1-9: 0
1. LITTLE INTEREST OR PLEASURE IN DOING THINGS: NOT AT ALL
2. FEELING DOWN, DEPRESSED OR HOPELESS: NOT AT ALL
SUM OF ALL RESPONSES TO PHQ QUESTIONS 1-9: 0
SUM OF ALL RESPONSES TO PHQ9 QUESTIONS 1 & 2: 0
1. LITTLE INTEREST OR PLEASURE IN DOING THINGS: NOT AT ALL

## 2025-04-25 ENCOUNTER — OFFICE VISIT (OUTPATIENT)
Dept: FAMILY MEDICINE CLINIC | Age: 55
End: 2025-04-25
Payer: COMMERCIAL

## 2025-04-25 ENCOUNTER — HOSPITAL ENCOUNTER (OUTPATIENT)
Age: 55
Discharge: HOME | End: 2025-04-25
Payer: COMMERCIAL

## 2025-04-25 VITALS
DIASTOLIC BLOOD PRESSURE: 80 MMHG | WEIGHT: 175 LBS | HEIGHT: 62 IN | SYSTOLIC BLOOD PRESSURE: 130 MMHG | HEART RATE: 76 BPM | BODY MASS INDEX: 32.2 KG/M2

## 2025-04-25 DIAGNOSIS — E66.9 OBESITY WITH BODY MASS INDEX 30 OR GREATER: ICD-10-CM

## 2025-04-25 DIAGNOSIS — J45.20 MILD INTERMITTENT ASTHMA WITHOUT COMPLICATION: ICD-10-CM

## 2025-04-25 DIAGNOSIS — E11.9 TYPE 2 DIABETES MELLITUS WITHOUT COMPLICATION, WITHOUT LONG-TERM CURRENT USE OF INSULIN (HCC): Primary | ICD-10-CM

## 2025-04-25 DIAGNOSIS — E11.9 TYPE 2 DIABETES MELLITUS WITHOUT COMPLICATION, WITHOUT LONG-TERM CURRENT USE OF INSULIN (HCC): ICD-10-CM

## 2025-04-25 DIAGNOSIS — Z12.11 ENCOUNTER FOR SCREENING COLONOSCOPY: ICD-10-CM

## 2025-04-25 LAB
ALBUMIN SERPL-MCNC: 4.3 G/DL (ref 3.5–5.2)
ALBUMIN/GLOB SERPL: 1.4 {RATIO} (ref 1–2.5)
ALP SERPL-CCNC: 92 U/L (ref 35–104)
ALT SERPL-CCNC: 23 U/L (ref 10–35)
ANION GAP SERPL CALCULATED.3IONS-SCNC: 13 MMOL/L (ref 9–16)
AST SERPL-CCNC: 20 U/L (ref 10–35)
BASOPHILS # BLD: 0.04 K/UL (ref 0–0.2)
BASOPHILS NFR BLD: 1 % (ref 0–2)
BILIRUB SERPL-MCNC: 0.3 MG/DL (ref 0–1.2)
BUN SERPL-MCNC: 17 MG/DL (ref 6–20)
BUN/CREAT SERPL: 24 (ref 9–20)
CALCIUM SERPL-MCNC: 9.4 MG/DL (ref 8.6–10.4)
CHLORIDE SERPL-SCNC: 105 MMOL/L (ref 98–107)
CHOLEST SERPL-MCNC: 191 MG/DL (ref 0–199)
CHOLESTEROL/HDL RATIO: 4
CO2 SERPL-SCNC: 23 MMOL/L (ref 20–31)
CREAT SERPL-MCNC: 0.7 MG/DL (ref 0.6–0.9)
CREAT UR-MCNC: 152 MG/DL (ref 28–217)
EOSINOPHIL # BLD: 0.42 K/UL (ref 0–0.44)
EOSINOPHILS RELATIVE PERCENT: 7 % (ref 1–4)
ERYTHROCYTE [DISTWIDTH] IN BLOOD BY AUTOMATED COUNT: 13.2 % (ref 11.8–14.4)
EST. AVERAGE GLUCOSE BLD GHB EST-MCNC: 151 MG/DL
GFR, ESTIMATED: >90 ML/MIN/1.73M2
GLUCOSE SERPL-MCNC: 175 MG/DL (ref 74–99)
HBA1C MFR BLD: 6.9 % (ref 4–6)
HCT VFR BLD AUTO: 40.2 % (ref 36.3–47.1)
HDLC SERPL-MCNC: 48 MG/DL
HGB BLD-MCNC: 13.5 G/DL (ref 11.9–15.1)
IMM GRANULOCYTES # BLD AUTO: <0.03 K/UL (ref 0–0.3)
IMM GRANULOCYTES NFR BLD: 0 %
LDLC SERPL CALC-MCNC: 116 MG/DL (ref 0–100)
LYMPHOCYTES NFR BLD: 2.15 K/UL (ref 1.1–3.7)
LYMPHOCYTES RELATIVE PERCENT: 35 % (ref 24–43)
MCH RBC QN AUTO: 28.7 PG (ref 25.2–33.5)
MCHC RBC AUTO-ENTMCNC: 33.6 G/DL (ref 25.2–33.5)
MCV RBC AUTO: 85.5 FL (ref 82.6–102.9)
MICROALBUMIN UR-MCNC: <12 MG/L (ref 0–20)
MICROALBUMIN/CREAT UR-RTO: NORMAL MCG/MG CREAT (ref 0–25)
MONOCYTES NFR BLD: 0.44 K/UL (ref 0.1–1.2)
MONOCYTES NFR BLD: 7 % (ref 3–12)
NEUTROPHILS NFR BLD: 50 % (ref 36–65)
NEUTS SEG NFR BLD: 3.02 K/UL (ref 1.5–8.1)
NRBC BLD-RTO: 0 PER 100 WBC
PLATELET # BLD AUTO: 201 K/UL (ref 138–453)
PMV BLD AUTO: 10.6 FL (ref 8.1–13.5)
POTASSIUM SERPL-SCNC: 5 MMOL/L (ref 3.7–5.3)
PROT SERPL-MCNC: 7.4 G/DL (ref 6.6–8.7)
RBC # BLD AUTO: 4.7 M/UL (ref 3.95–5.11)
SODIUM SERPL-SCNC: 141 MMOL/L (ref 136–145)
TRIGL SERPL-MCNC: 135 MG/DL
VLDLC SERPL CALC-MCNC: 27 MG/DL (ref 1–30)
WBC OTHER # BLD: 6.1 K/UL (ref 3.5–11.3)

## 2025-04-25 PROCEDURE — 83036 HEMOGLOBIN GLYCOSYLATED A1C: CPT

## 2025-04-25 PROCEDURE — 80053 COMPREHEN METABOLIC PANEL: CPT

## 2025-04-25 PROCEDURE — 36415 COLL VENOUS BLD VENIPUNCTURE: CPT

## 2025-04-25 PROCEDURE — 99214 OFFICE O/P EST MOD 30 MIN: CPT | Performed by: FAMILY MEDICINE

## 2025-04-25 PROCEDURE — 85025 COMPLETE CBC W/AUTO DIFF WBC: CPT

## 2025-04-25 PROCEDURE — 80061 LIPID PANEL: CPT

## 2025-04-25 PROCEDURE — 82570 ASSAY OF URINE CREATININE: CPT

## 2025-04-25 PROCEDURE — 82043 UR ALBUMIN QUANTITATIVE: CPT

## 2025-04-25 ASSESSMENT — ENCOUNTER SYMPTOMS
ALLERGIC/IMMUNOLOGIC NEGATIVE: 1
DIARRHEA: 1
EYES NEGATIVE: 1
RESPIRATORY NEGATIVE: 1

## 2025-04-25 NOTE — PROGRESS NOTES
Subjective:      Patient ID: Kori Darden is a 54 y.o. female.    Diabetes  Pertinent negatives for diabetes include no polydipsia, no polyphagia and no polyuria.   Leg Pain     Asthma  Her past medical history is significant for asthma.        Routine follow up on chronic medical conditions, refills, and review of updated labs.     She had gestational diabetes with both pregnancies requiring insulin.  She feels well.  No polydipsia or polyphagia.  Wt. Fairly stable over the interval.   She has no acute concerns or symptoms on review.  Father had diabetes as an adult.  She is not checking bs at home.  Taking glucophage 750mg daily.  Compliant with medications.  Some intermittent diarrhea, tolerable, not much at present. .  No asthma concerns.  Has not needed rescue inhaler over 6 months.  Only using symbicort 1  times/week.  She has decreased the dose to minimal effective range.  Overall stable.  She is finding the minimum effective dose.  Has done this long term.  Not much use at present.    Varicose vein surgery 24: worked well.  Pain and discomfort in the leg gone.   Past Medical History:   Diagnosis Date    Asthma     Diabetes mellitus type 2, uncomplicated (HCC)     Elbow fracture 2017    Left elbow    Gestational diabetes     Obesity      Past Surgical History:   Procedure Laterality Date     SECTION      x2    TONSILLECTOMY      VARICOSE VEIN SURGERY Right 2024     Current Outpatient Medications   Medication Sig Dispense Refill    metFORMIN (GLUCOPHAGE-XR) 750 MG extended release tablet Take 1 tablet by mouth daily 90 tablet 1    budesonide-formoterol (SYMBICORT) 80-4.5 MCG/ACT AERO INHALE TWO PUFFS BY MOUTH DAILY 10.2 g 5    albuterol sulfate HFA (PROVENTIL;VENTOLIN;PROAIR) 108 (90 Base) MCG/ACT inhaler Inhale 2 puffs into the lungs every 6 hours as needed for Wheezing 1 each 1     No current facility-administered medications for this visit.     Allergies   Allergen Reactions    Bactrim

## 2025-08-04 DIAGNOSIS — Z12.31 VISIT FOR SCREENING MAMMOGRAM: Primary | ICD-10-CM

## 2025-09-02 RX ORDER — ALBUTEROL SULFATE 90 UG/1
2 INHALANT RESPIRATORY (INHALATION) EVERY 6 HOURS PRN
Qty: 1 EACH | Refills: 1 | Status: SHIPPED | OUTPATIENT
Start: 2025-09-02